# Patient Record
Sex: MALE | Race: WHITE | NOT HISPANIC OR LATINO | Employment: FULL TIME | URBAN - METROPOLITAN AREA
[De-identification: names, ages, dates, MRNs, and addresses within clinical notes are randomized per-mention and may not be internally consistent; named-entity substitution may affect disease eponyms.]

---

## 2018-05-16 ENCOUNTER — TELEPHONE (OUTPATIENT)
Dept: OTHER | Facility: CLINIC | Age: 63
End: 2018-05-16

## 2018-06-14 ENCOUNTER — HOSPITAL ENCOUNTER (OUTPATIENT)
Dept: GENERAL RADIOLOGY | Facility: CLINIC | Age: 63
Discharge: HOME | End: 2018-06-14

## 2018-06-14 DIAGNOSIS — R76.11 POSITIVE PPD: ICD-10-CM

## 2018-06-14 PROCEDURE — 40000293 XR CHEST 1 VIEW, EMPLOYEE HEALTH

## 2019-10-05 ENCOUNTER — HEALTH MAINTENANCE LETTER (OUTPATIENT)
Age: 64
End: 2019-10-05

## 2019-12-05 ENCOUNTER — OFFICE VISIT (OUTPATIENT)
Dept: FAMILY MEDICINE | Facility: CLINIC | Age: 64
End: 2019-12-05
Payer: COMMERCIAL

## 2019-12-05 VITALS
TEMPERATURE: 98.2 F | HEART RATE: 76 BPM | OXYGEN SATURATION: 95 % | DIASTOLIC BLOOD PRESSURE: 82 MMHG | HEIGHT: 74 IN | SYSTOLIC BLOOD PRESSURE: 150 MMHG | WEIGHT: 315 LBS | BODY MASS INDEX: 40.43 KG/M2

## 2019-12-05 DIAGNOSIS — E66.01 MORBID OBESITY (H): ICD-10-CM

## 2019-12-05 DIAGNOSIS — Z12.11 SPECIAL SCREENING FOR MALIGNANT NEOPLASMS, COLON: ICD-10-CM

## 2019-12-05 DIAGNOSIS — Z12.5 SCREENING FOR PROSTATE CANCER: ICD-10-CM

## 2019-12-05 DIAGNOSIS — I10 ESSENTIAL HYPERTENSION: ICD-10-CM

## 2019-12-05 DIAGNOSIS — E66.813 CLASS 3 SEVERE OBESITY WITHOUT SERIOUS COMORBIDITY WITH BODY MASS INDEX (BMI) OF 45.0 TO 49.9 IN ADULT, UNSPECIFIED OBESITY TYPE (H): ICD-10-CM

## 2019-12-05 DIAGNOSIS — Z00.00 WELCOME TO MEDICARE PREVENTIVE VISIT: Primary | ICD-10-CM

## 2019-12-05 DIAGNOSIS — E66.01 CLASS 3 SEVERE OBESITY WITHOUT SERIOUS COMORBIDITY WITH BODY MASS INDEX (BMI) OF 45.0 TO 49.9 IN ADULT, UNSPECIFIED OBESITY TYPE (H): ICD-10-CM

## 2019-12-05 DIAGNOSIS — Z23 NEED FOR SHINGLES VACCINE: ICD-10-CM

## 2019-12-05 DIAGNOSIS — Z83.3 FAMILY HISTORY OF DIABETES MELLITUS: ICD-10-CM

## 2019-12-05 DIAGNOSIS — R73.9 ELEVATED BLOOD SUGAR: ICD-10-CM

## 2019-12-05 PROCEDURE — 90471 IMMUNIZATION ADMIN: CPT | Performed by: FAMILY MEDICINE

## 2019-12-05 PROCEDURE — G0402 INITIAL PREVENTIVE EXAM: HCPCS | Performed by: FAMILY MEDICINE

## 2019-12-05 RX ORDER — LISINOPRIL 20 MG/1
20 TABLET ORAL DAILY
Refills: 0 | COMMUNITY
Start: 2018-12-09 | End: 2019-12-05

## 2019-12-05 RX ORDER — LISINOPRIL 10 MG/1
20 TABLET ORAL DAILY
Qty: 90 TABLET | Refills: 3 | Status: SHIPPED | OUTPATIENT
Start: 2019-12-05 | End: 2019-12-19

## 2019-12-05 ASSESSMENT — ENCOUNTER SYMPTOMS
FEVER: 0
DIZZINESS: 0
HEMATOCHEZIA: 0
ALLERGIC/IMMUNOLOGIC NEGATIVE: 1
HEMATURIA: 0
ENDOCRINE NEGATIVE: 1
ABDOMINAL PAIN: 0
EYE PAIN: 0
NERVOUS/ANXIOUS: 1
COUGH: 0
CHILLS: 0
HEMATOLOGIC/LYMPHATIC NEGATIVE: 1
CONSTIPATION: 1
DIARRHEA: 1
FREQUENCY: 1
MUSCULOSKELETAL NEGATIVE: 1

## 2019-12-05 ASSESSMENT — ACTIVITIES OF DAILY LIVING (ADL)
CURRENT_FUNCTION: HOUSEWORK REQUIRES ASSISTANCE
CURRENT_FUNCTION: LAUNDRY REQUIRES ASSISTANCE
CURRENT_FUNCTION: MONEY MANAGEMENT REQUIRES ASSISTANCE

## 2019-12-05 ASSESSMENT — MIFFLIN-ST. JEOR: SCORE: 2721.45

## 2019-12-05 ASSESSMENT — PATIENT HEALTH QUESTIONNAIRE - PHQ9
SUM OF ALL RESPONSES TO PHQ QUESTIONS 1-9: 21
10. IF YOU CHECKED OFF ANY PROBLEMS, HOW DIFFICULT HAVE THESE PROBLEMS MADE IT FOR YOU TO DO YOUR WORK, TAKE CARE OF THINGS AT HOME, OR GET ALONG WITH OTHER PEOPLE: SOMEWHAT DIFFICULT
SUM OF ALL RESPONSES TO PHQ QUESTIONS 1-9: 21

## 2019-12-05 ASSESSMENT — PAIN SCALES - GENERAL: PAINLEVEL: NO PAIN (0)

## 2019-12-05 NOTE — PROGRESS NOTES
"SUBJECTIVE:   Malcom Coates is a 63 year old male who presents for Preventive Visit.  Annual physical.  History of hypertension, morbid obesity.  Has not seen a physician in a while.  Denies chest pain, denies shortness of breath.  Are you in the first 12 months of your Medicare coverage?  Yes,  Visual Acuity:  Right Eye: wear glasses   Left Eye: wear glasses  Both Eyes:     Healthy Habits:     In general, how would you rate your overall health?  Fair    Frequency of exercise:  None    Do you usually eat at least 4 servings of fruit and vegetables a day, include whole grains    & fiber and avoid regularly eating high fat or \"junk\" foods?  No    Taking medications regularly:  No    Barriers to taking medications:  Cost of medication and Side effects    Medication side effects:  Other    Ability to successfully perform activities of daily living:  Housework requires assistance, laundry requires assistance and money management requires assistance    Home Safety:  Lack of grab bars in the bathroom    Hearing Impairment:  Feel that people are mumbling or not speaking clearly, need to ask people to speak up or repeat themselves and difficulty understanding soft or whispered speech    In the past 6 months, have you been bothered by leaking of urine? Yes    In general, how would you rate your overall mental or emotional health?  Fair      PHQ-2 Total Score: 5    Additional concerns today:  Yes    Do you feel safe in your environment? Yes    Have you ever done Advance Care Planning? (For example, a Health Directive, POLST, or a discussion with a medical provider or your loved ones about your wishes): No, advance care planning information given to patient to review.  Patient plans to discuss their wishes with loved ones or provider.      Fall risk  Fallen 2 or more times in the past year?: No  Any fall with injury in the past year?: No    Cognitive Screening   1) Repeat 3 items (Leader, Season, Table)    2) Clock draw: " NORMAL  3) 3 item recall: Recalls 3 objects  Results: 3 items recalled: COGNITIVE IMPAIRMENT LESS LIKELY    Mini-CogTM Copyright S [employer]rie. Licensed by the author for use in [facility]; reprinted with permission (hamlet@[facility]). All rights reserved.      Do you have sleep apnea, excessive snoring or daytime drowsiness?: restless sleep, excessive snoring    Reviewed and updated as needed this visit by clinical staff  Tobacco  Allergies  Meds  Med Hx  Surg Hx  Fam Hx  Soc Hx        Reviewed and updated as needed this visit by Provider        Social History     Tobacco Use     Smoking status: Never Smoker     Smokeless tobacco: Never Used   Substance Use Topics     Alcohol use: Yes     Comment: occassionally         Alcohol Use 12/5/2019   Prescreen: >3 drinks/day or >7 drinks/week? No   Prescreen: >3 drinks/day or >7 drinks/week? -   No flowsheet data found.        Current providers sharing in care for this patient include:   Patient Care Team:  Enriqueta Hinkle PA-C as PCP - General (Family Practice)    The following health maintenance items are reviewed in Epic and correct as of today:  Health Maintenance   Topic Date Due     HEPATITIS C SCREENING  1955     DEPRESSION ACTION PLAN  1955     HIV SCREENING  12/30/1969     ZOSTER IMMUNIZATION (1 of 2) 12/30/2004     PHQ-9  06/01/2012     MEDICARE ANNUAL WELLNESS VISIT  12/01/2012     FIT  12/28/2012     LIPID  12/01/2016     ADVANCE CARE PLANNING  12/01/2016     DTAP/TDAP/TD IMMUNIZATION (2 - Td) 12/01/2021     INFLUENZA VACCINE  Addressed     IPV IMMUNIZATION  Aged Out     MENINGITIS IMMUNIZATION  Aged Out     BP Readings from Last 3 Encounters:   12/05/19 (!) 150/82   12/01/11 159/92    Wt Readings from Last 3 Encounters:   12/05/19 (!) 185.5 kg (409 lb)   12/01/11 (!) 204.1 kg (450 lb)                      Review of Systems   Constitutional: Negative for chills and fever.   HENT: Negative for congestion and ear pain.    Eyes:  "Negative for pain.   Respiratory: Negative for cough.    Cardiovascular: Negative for chest pain.   Gastrointestinal: Positive for constipation and diarrhea. Negative for abdominal pain and hematochezia.   Endocrine: Negative.    Genitourinary: Positive for frequency. Negative for hematuria.   Musculoskeletal: Negative.    Skin: Negative.    Allergic/Immunologic: Negative.    Neurological: Negative for dizziness.   Hematological: Negative.    Psychiatric/Behavioral: The patient is nervous/anxious.      Constitutional, HEENT, cardiovascular, pulmonary, GI, , musculoskeletal, neuro, skin, endocrine and psych systems are negative, except as otherwise noted.    OBJECTIVE:   BP (!) 174/84 (BP Location: Right arm, Patient Position: Chair, Cuff Size: Adult Large)   Pulse 76   Temp 98.2  F (36.8  C) (Oral)   Ht 1.89 m (6' 2.41\")   Wt (!) 185.5 kg (409 lb)   SpO2 95%   BMI 51.94 kg/m   Estimated body mass index is 51.94 kg/m  as calculated from the following:    Height as of this encounter: 1.89 m (6' 2.41\").    Weight as of this encounter: 185.5 kg (409 lb).  Physical Exam  GENERAL: healthy, alert and no distress  EYES: Eyes grossly normal to inspection, PERRL and conjunctivae and sclerae normal  HENT: ear canals and TM's normal, nose and mouth without ulcers or lesions  NECK: no adenopathy, no asymmetry, masses, or scars and thyroid normal to palpation  RESP: lungs clear to auscultation - no rales, rhonchi or wheezes  CV: regular rate and rhythm, normal S1 S2, no S3 or S4, no murmur, click or rub, no peripheral edema and peripheral pulses strong  ABDOMEN: soft, nontender, no hepatosplenomegaly, no masses and bowel sounds normal  MS: trace edema to bilaterally  SKIN: no suspicious lesions or rashes  NEURO: Normal strength and tone, mentation intact and speech normal  PSYCH: mentation appears normal, affect normal/bright    Diagnostic Test Results:  Labs reviewed in Epic  Orders Placed This Encounter   Procedures     " "DEPRESSION ACTION PLAN (DAP)     VACCINE ADMINISTRATION, INITIAL     Hepatitis C Screen Reflex to HCV RNA Quant and Genotype     HIV Screening     Lipid panel reflex to direct LDL Non-fasting     Fecal colorectal cancer screen (FIT)     PSA, screen     Comprehensive metabolic panel     Hemoglobin A1c     BARIATRIC ADULT REFERRAL       ASSESSMENT / PLAN:       ICD-10-CM    1. Welcome to Medicare preventive visit Z00.00 Hepatitis C Screen Reflex to HCV RNA Quant and Genotype     HIV Screening     Lipid panel reflex to direct LDL Non-fasting     DEPRESSION ACTION PLAN (DAP)     PSA, screen     Comprehensive metabolic panel     zoster vaccine recombinant adjuvanted (SHINGRIX) injection   2. Screening for prostate cancer Z12.5 PSA, screen   3. Morbid obesity (H) E66.01    4. Class 3 severe obesity without serious comorbidity with body mass index (BMI) of 45.0 to 49.9 in adult, unspecified obesity type (H) E66.01 BARIATRIC ADULT REFERRAL    Z68.42    5. Family history of diabetes mellitus Z83.3    6. Essential hypertension I10 lisinopril (PRINIVIL/ZESTRIL) 10 MG tablet   7. Special screening for malignant neoplasms, colon Z12.11 Fecal colorectal cancer screen (FIT)   8. Elevated blood sugar R73.9 Hemoglobin A1c   9. Need for shingles vaccine Z23 VACCINE ADMINISTRATION, INITIAL       COUNSELING:  Reviewed preventive health counseling, as reflected in patient instructions       Regular exercise       Healthy diet/nutrition       Vision screening       Hearing screening       Dental care       Fall risk prevention       Immunizations    Vaccinated for: Zoster             Safe sex practices/STD prevention       Colon cancer screening    Estimated body mass index is 51.94 kg/m  as calculated from the following:    Height as of this encounter: 1.89 m (6' 2.41\").    Weight as of this encounter: 185.5 kg (409 lb).    Weight management plan: Patient referred to endocrine and/or weight management specialty Discussed healthy diet " and exercise guidelines     reports that he has never smoked. He has never used smokeless tobacco.      Appropriate preventive services were discussed with this patient, including applicable screening as appropriate for cardiovascular disease, diabetes, osteopenia/osteoporosis, and glaucoma.  As appropriate for age/gender, discussed screening for colorectal cancer, prostate cancer, breast cancer, and cervical cancer. Checklist reviewing preventive services available has been given to the patient.    Reviewed patients plan of care and provided an AVS. The Basic Care Plan (routine screening as documented in Health Maintenance) for Malcom meets the Care Plan requirement. This Care Plan has been established and reviewed with the Patient.    Counseling Resources:  ATP IV Guidelines  Pooled Cohorts Equation Calculator  Breast Cancer Risk Calculator  FRAX Risk Assessment  ICSI Preventive Guidelines  Dietary Guidelines for Americans, 2010  [employer] MyPlate  ASA Prophylaxis  Lung CA Screening    Henrique Hartman MD  [facility]    Identified Health Risks:  Answers for HPI/ROS submitted by the patient on 12/5/2019   Annual Exam:  If you checked off any problems, how difficult have these problems made it for you to do your work, take care of things at home, or get along with other people?: Somewhat difficult  PHQ9 TOTAL SCORE: 21

## 2019-12-05 NOTE — PATIENT INSTRUCTIONS
Patient Education     Eating Heart-Healthy Foods  Eating has a big impact on your heart health. In fact, eating healthier can improve several of your heart risks at once. For instance, it helps you manage weight, cholesterol, and blood pressure. Here are ideas to help you make heart-healthy changes without giving up all the foods and flavors you love.  Getting started    Talk with your healthcare provider about eating plans, such as the DASH or Mediterranean diet. You may also be referred to a dietitian.    Change a few things at a time. Give yourself time to get used to a few eating changes before adding more.    Work to create a tasty, healthy eating plan that you can stick to for the rest of your life.    Goals for healthy eating  Below are some tips to improve your eating habits:    Limit saturated fats and trans fats. Saturated fats raise your levels of cholesterol, so keep these fats to a minimum. They are found in foods such as fatty meats, whole milk, cheese, and palm and coconut oils. Avoid trans fats because they lower good cholesterol as well as raise bad cholesterol. Trans fats are most often found in processed foods.    Reduce sodium (salt) intake. Eating too much salt may increase your blood pressure. Limit your sodium intake to 2,300 milligrams (mg) per day (the amount in 1 teaspoon of salt), or less if your healthcare provider recommends it. Dining out less often and eating fewer processed foods are two great ways to decrease the amount of salt you consume.    Managing calories. A calorie is a unit of energy. Your body burns calories for fuel, but if you eat more calories than your body burns, the extras are stored as fat. Your healthcare provider can help you create a diet plan to manage your calories. This will likely include eating healthier foods as well as exercising regularly. To help you track your progress, keep a diary to record what you eat and how often you exercise.  Choose the right  foods  Aim to make these foods staples of your diet. If you have diabetes, you may have different recommendations than what is listed here:    Fruits and vegetables provide plenty of nutrients without a lot of calories. At meals, fill half your plate with these foods. Split the other half of your plate between whole grains and lean protein.    Whole grains are high in fiber and rich in vitamins and nutrients. Good choices include whole-wheat bread, pasta, and brown rice.    Lean proteins give you nutrition with less fat. Good choices include fish, skinless chicken, and beans.    Low-fat or nonfat dairy provides nutrients without a lot of fat. Try low-fat or nonfat milk, cheese, or yogurt.    Healthy fats can be good for you in small amounts. These are unsaturated fats, such as olive oil, nuts, and fish. Try to have at least 2 servings per week of fatty fish, such as salmon, sardines, mackerel, rainbow trout, and albacore tuna. These contain omega-3 fatty acids, which are good for your heart. Flaxseed is another source of a heart-healthy fat.  More on heart-healthy eating  Read food labels  Healthy eating starts at the grocery store. Be sure to pay attention to food labels on packaged foods. Look for products that are high in fiber and protein, and low in saturated fat, cholesterol, and sodium. Avoid products that contain trans fat. And pay close attention to serving size. For instance, if you plan to eat two servings, double all the numbers on the label.  Prepare food right  A key part of healthy cooking is cutting down on added fat and salt. Look on the internet for lower-fat, lower-sodium recipes. Also, try these tips:    Remove fat from meat and skin from poultry before cooking.    Skim fat from the surface of soups and sauces.    Broil, boil, bake, steam, grill, and microwave food without added fats.    Choose ingredients that spice up your food without adding calories, fat, or sodium. Try these items:  horseradish, hot sauce, lemon, mustard, nonfat salad dressings, and vinegar. For salt-free herbs and spices, try basil, cilantro, cinnamon, pepper, and rosemary.  Date Last Reviewed: 10/1/2017    9339-4820 [employer]. [address] [place] [address]. All rights reserved. This information is not intended as a substitute for professional medical care. Always follow your healthcare professional's instructions.           Patient Education     Prevention Guidelines, Men Ages 50 to 64  Screening tests and vaccines are an important part of managing your health. A screening test is done to find possible disorders or diseases in people who don't have any symptoms. The goal is to find a disease early so lifestyle changes can be made and you can be watched more closely to reduce the risk of disease, or to detect it early enough to treat it most effectively. Screening tests are not considered diagnostic, but are used to determine if more testing is needed. Health counseling is essential, too. Below are guidelines for these, for men ages 50 to 64. Talk with your healthcare provider to make sure you re up-to-date on what you need.  Screening Who needs it How often   Alcohol misuse All men in this age group At routine exams   Blood pressure All men in this age group Yearly checkup if your blood pressure is normal  Normal blood pressure is less than 120/80 mm Hg  If your blood pressure reading is higher than normal, follow the advice of your healthcare provider      Colorectal cancer All men in this age group Flexible sigmoidoscopy every 5 years, or colonoscopy every 10 years, or double-contrast barium enema every 5 years; yearly fecal occult blood test or fecal immunochemical test; or a stool DNA test as often as your healthcare provider advises; talk with your healthcare provider about which tests are best for you   Depression All men in this age group At routine exams   Type 2 diabetes or prediabetes All  men beginning at age 45 and men without symptoms at any age who are overweight or obese and have 1 or more other risk factors for diabetes At least every 3 years (yearly if your blood sugar has already begun to rise)   Type 2 diabetes All men with prediabetes Every year   Hepatitis C Men at increased risk for infection - talk with your healthcare provider At routine exams. All men ages 50 to 70 should be tested at least once for hepatitis C.   High cholesterol or triglycerides All men in this age group At least every 5 years   HIV Men at increased risk for infection - talk with your healthcare provider At routine exams   Lung cancer Adults age 55 to 80 who have smoked Yearly screening in smokers with 30 pack-year history of smoking or who quit within 15 years   Obesity All men in this age group At routine exams   Prostate cancer Starting at age 45, talk to healthcare provider about risks and benefits of digital rectal exam (WONG) and prostate-specific antigen (PSA) screening1 At routine exams   Syphilis Men at increased risk for infection - talk with your healthcare provider At routine exams   Tuberculosis Men at increased risk for infection - talk with your healthcare provider Ask your healthcare provider   Vision All men in this age group Ask your healthcare provider   Vaccine Who needs it How often   Chickenpox (varicella) All men in this age group who have no record of this infection or vaccine 2 doses; second dose should be given at least 4 weeks after the first dose   Hepatitis A Men at increased risk for infection - talk with your healthcare provider 2 doses given at least 6 months apart   Hepatitis B Men at increased risk for infection - talk with your healthcare provider 3 doses over 6 months; second dose should be given 1 month after the first dose; the third dose should be given at least 2 months after the second dose and at least 4 months after the first dose   Haemophilus influenzae Type B (HIB) Men at  increased risk for infection - talk with your healthcare provider 1 to 3 doses   Influenza (flu) All men in this age group Once a year   Measles, mumps, rubella (MMR) Men in this age group through their late 50s who have no record of these infections or vaccines 1 or 2 doses; ask your healthcare provider   Meningococcal Men at increased risk for infection - talk with your healthcare provider 1 or more doses   Pneumococcal conjugate vaccine (PCV13) and pneumococcal polysaccharide vaccine (PPSV23) Men at increased risk for infection - talk with your healthcare provider PCV13: 1 dose ages 19 to 65 (protects against 13 types of pneumococcal bacteria)     PPSV23: 1 to 2 doses through age 64, or 1 dose at 65 or older (protects against 23 types of pneumococcal bacteria)      Tetanus/diphtheria/  pertussis (Td/Tdap) booster All men in this age group Td every 10 years, or a one-time dose of Tdap instead of a Td booster after age 18, then Td every 10 years   Zoster All men ages 60 and older 1 dose   Counseling Who needs it How often   Diet and exercise Men who are overweight or obese When diagnosed, and then at routine exams   Sexually transmitted infection prevention Men at increased risk for infection - talk with your healthcare provider At routine exams   Use of daily aspirin Men in this age group at risk for cardiovascular health problems At routine exams   Use of tobacco and the health effects it can cause All men in this age group Every visit   [address] [place] [address] [facility]  Date Last Reviewed: 2/1/2017 2000-2018 [employer]. [address] [place] [address], [place] [address]. All rights reserved. This information is not intended as a substitute for professional medical care. Always follow your healthcare professional's instructions.

## 2019-12-05 NOTE — NURSING NOTE
Clinic Administered Medication Documentation    MEDICATION LIST:   Injectable Medication Documentation    Patient was given Shingrix. Prior to medication administration, verified patients identity using patient s name and date of birth. Please see MAR and medication order for additional information. Patient instructed to remain in clinic for 15 minutes, report any adverse reaction to staff immediately  and VIS given.      Was entire vial of medication used? Yes  Vial/Syringe: Single dose vial  Expiration Date:  03-, Diluent: 03-  Was this medication supplied by the patient? No   Colleen Leyva CMA on 12/5/2019 at 11:38 AM

## 2019-12-06 ASSESSMENT — PATIENT HEALTH QUESTIONNAIRE - PHQ9: SUM OF ALL RESPONSES TO PHQ QUESTIONS 1-9: 21

## 2019-12-19 ENCOUNTER — OFFICE VISIT (OUTPATIENT)
Dept: FAMILY MEDICINE | Facility: CLINIC | Age: 64
End: 2019-12-19
Payer: COMMERCIAL

## 2019-12-19 VITALS
TEMPERATURE: 97.5 F | SYSTOLIC BLOOD PRESSURE: 138 MMHG | BODY MASS INDEX: 51.94 KG/M2 | WEIGHT: 315 LBS | OXYGEN SATURATION: 97 % | HEART RATE: 62 BPM | DIASTOLIC BLOOD PRESSURE: 81 MMHG

## 2019-12-19 DIAGNOSIS — Z00.00 ANNUAL PHYSICAL EXAM: Primary | ICD-10-CM

## 2019-12-19 DIAGNOSIS — Z12.5 SCREENING FOR PROSTATE CANCER: ICD-10-CM

## 2019-12-19 DIAGNOSIS — Z83.3 FAMILY HISTORY OF DIABETES MELLITUS: ICD-10-CM

## 2019-12-19 DIAGNOSIS — Z12.11 SPECIAL SCREENING FOR MALIGNANT NEOPLASMS, COLON: ICD-10-CM

## 2019-12-19 DIAGNOSIS — I10 ESSENTIAL HYPERTENSION: ICD-10-CM

## 2019-12-19 LAB
ALBUMIN SERPL-MCNC: 3.9 G/DL (ref 3.4–5)
ALP SERPL-CCNC: 106 U/L (ref 40–150)
ALT SERPL W P-5'-P-CCNC: 24 U/L (ref 0–70)
ANION GAP SERPL CALCULATED.3IONS-SCNC: 4 MMOL/L (ref 3–14)
AST SERPL W P-5'-P-CCNC: 23 U/L (ref 0–45)
BILIRUB SERPL-MCNC: 1.4 MG/DL (ref 0.2–1.3)
BUN SERPL-MCNC: 25 MG/DL (ref 7–30)
CALCIUM SERPL-MCNC: 9.2 MG/DL (ref 8.5–10.1)
CHLORIDE SERPL-SCNC: 106 MMOL/L (ref 94–109)
CHOLEST SERPL-MCNC: 165 MG/DL
CO2 SERPL-SCNC: 30 MMOL/L (ref 20–32)
CREAT SERPL-MCNC: 0.94 MG/DL (ref 0.66–1.25)
GFR SERPL CREATININE-BSD FRML MDRD: 85 ML/MIN/{1.73_M2}
GLUCOSE SERPL-MCNC: 122 MG/DL (ref 70–99)
HBA1C MFR BLD: 6.3 % (ref 0–5.6)
HDLC SERPL-MCNC: 37 MG/DL
HEMOCCULT STL QL IA: NEGATIVE
LDLC SERPL CALC-MCNC: 109 MG/DL
NONHDLC SERPL-MCNC: 128 MG/DL
POTASSIUM SERPL-SCNC: 4.7 MMOL/L (ref 3.4–5.3)
PROT SERPL-MCNC: 7.5 G/DL (ref 6.8–8.8)
PSA SERPL-ACNC: 1.94 UG/L (ref 0–4)
SODIUM SERPL-SCNC: 140 MMOL/L (ref 133–144)
TRIGL SERPL-MCNC: 94 MG/DL

## 2019-12-19 PROCEDURE — 80053 COMPREHEN METABOLIC PANEL: CPT | Performed by: FAMILY MEDICINE

## 2019-12-19 PROCEDURE — G0103 PSA SCREENING: HCPCS | Performed by: FAMILY MEDICINE

## 2019-12-19 PROCEDURE — 83036 HEMOGLOBIN GLYCOSYLATED A1C: CPT | Performed by: FAMILY MEDICINE

## 2019-12-19 PROCEDURE — 86803 HEPATITIS C AB TEST: CPT | Performed by: FAMILY MEDICINE

## 2019-12-19 PROCEDURE — 87389 HIV-1 AG W/HIV-1&-2 AB AG IA: CPT | Performed by: FAMILY MEDICINE

## 2019-12-19 PROCEDURE — 99213 OFFICE O/P EST LOW 20 MIN: CPT | Performed by: FAMILY MEDICINE

## 2019-12-19 PROCEDURE — 36415 COLL VENOUS BLD VENIPUNCTURE: CPT | Performed by: FAMILY MEDICINE

## 2019-12-19 PROCEDURE — 82274 ASSAY TEST FOR BLOOD FECAL: CPT | Performed by: FAMILY MEDICINE

## 2019-12-19 PROCEDURE — 80061 LIPID PANEL: CPT | Performed by: FAMILY MEDICINE

## 2019-12-19 RX ORDER — LISINOPRIL 20 MG/1
20 TABLET ORAL DAILY
Qty: 90 TABLET | Refills: 3 | Status: SHIPPED | OUTPATIENT
Start: 2019-12-19 | End: 2021-02-15

## 2019-12-19 ASSESSMENT — PAIN SCALES - GENERAL: PAINLEVEL: NO PAIN (0)

## 2019-12-19 NOTE — PROGRESS NOTES
Subjective     Malcom Coates is a 63 year old male who presents to clinic today for the following health issues:    HPI :  Patient would like to do his blood work for his annual physical exam.  Blood pressure has been well controlled today he is on lisinopril 20 mg daily.  Has no side effect with the medication.    Medication Followup of Lisiniprol    Taking Medication as prescribed: yes    Side Effects:  None    Medication Helping Symptoms:  yes     Patient is also here for blood recheck.      Patient Active Problem List   Diagnosis     Advanced directives, counseling/discussion     Osteoarthritis (arthritis due to wear and tear of joints)     Moderate major depression (H)     CARDIOVASCULAR SCREENING; LDL GOAL LESS THAN 160     Overactive bladder     Obesity     Elevated blood sugar     Morbid obesity (H)     Leg fracture, left     Past Surgical History:   Procedure Laterality Date     C OPEN TX TIBIAL FRACTURE PROXIMAL UNICONDYLAR  1976    left      HC CLOSED TX SHOULDER DISLOC W MANIP NO ANESTH      LT.       Social History     Tobacco Use     Smoking status: Never Smoker     Smokeless tobacco: Never Used   Substance Use Topics     Alcohol use: Yes     Comment: occassionally     Family History   Problem Relation Age of Onset     Breast Cancer Mother      Cancer Mother         Brain     Diabetes Type 2  Mother      Unknown/Adopted Brother      Allergies Brother      Blood Disease Brother         AIDs     Allergies Brother      Asthma Brother          Current Outpatient Medications   Medication Sig Dispense Refill     lisinopril (PRINIVIL/ZESTRIL) 20 MG tablet Take 1 tablet (20 mg) by mouth daily 90 tablet 3     naproxen (NAPROSYN) 250 MG tablet Take 250 mg by mouth 2 times daily (with meals).       No Known Allergies    Reviewed and updated as needed this visit by Provider         Review of Systems   ROS COMP: Constitutional, HEENT, cardiovascular, pulmonary, gi and gu systems are negative, except as  otherwise noted.      Objective    There were no vitals taken for this visit.  There is no height or weight on file to calculate BMI.  Physical Exam   GENERAL: healthy, alert and no distress  MS: no gross musculoskeletal defects noted, no edema    Diagnostic Test Results:  Labs reviewed in Epic  Orders Placed This Encounter   Procedures     Hepatitis C Screen Reflex to HCV RNA Quant and Genotype     HIV Screening     Lipid panel reflex to direct LDL Fasting     PSA, screen     Comprehensive metabolic panel     Fecal colorectal cancer screen (FIT)     Hemoglobin A1c           Assessment & Plan       ICD-10-CM    1. Annual physical exam Z00.00 Hepatitis C Screen Reflex to HCV RNA Quant and Genotype     HIV Screening     Lipid panel reflex to direct LDL Fasting     Comprehensive metabolic panel   2. Special screening for malignant neoplasms, colon Z12.11 Fecal colorectal cancer screen (FIT)   3. Screening for prostate cancer Z12.5 PSA, screen   4. Family history of diabetes mellitus Z83.3 Hemoglobin A1c   5. Essential hypertension I10 lisinopril (PRINIVIL/ZESTRIL) 20 MG tablet   Continue with current blood pressure medication, lisinopril 20 mg daily.    Discussed diet, weight loss, healthy practices.       See Patient Instructions    Return in about 1 year (around 12/19/2020) for Physical Exam.    Henrique Hartman MD  [facility]

## 2019-12-20 LAB
HCV AB SERPL QL IA: NONREACTIVE
HIV 1+2 AB+HIV1 P24 AG SERPL QL IA: NONREACTIVE

## 2020-02-06 ENCOUNTER — TELEPHONE (OUTPATIENT)
Dept: FAMILY MEDICINE | Facility: CLINIC | Age: 65
End: 2020-02-06

## 2020-02-06 NOTE — LETTER
February 11, 2020      Malcom Coates  [address] [facility] [address]        To Whom It May Concern:    Malcom Coates , has history of obstructive sleep apnea he is on a CPAP machine.    Patient stated that his CPAP machine was broken.  He does need to have new machine.  His last sleep study was done in 2004      Sincerely,        Enriqueta Hinkle PA-C

## 2020-02-06 NOTE — TELEPHONE ENCOUNTER
Reason for Call: Request for an order or referral:    Order or referral being requested: new CPAP machine    Date needed: as soon as possible    Has the patient been seen by the PCP for this problem? NO    Additional comments: patient called [facility] because the machine he got from them in 2004 is broken and won't turn off and on. He was informed that they would need an order from his provider.    Patient would like a call when this is sent to [facility]    Phone number Patient can be reached at:  Cell number on file:    Telephone Information:   Mobile [phone]       Best Time:  anytime    Can we leave a detailed message on this number?  YES    Call taken on 2/6/2020 at 9:55 AM by Justin Rodgers

## 2020-02-07 NOTE — TELEPHONE ENCOUNTER
I do not have a copy of his previous sleep study and I do not have his index.  In order for us to write a new order to obtain a new CPAP machine need to have a flow parameter,  Hypopnea index  CPAP MIN,   And   CPAP MAX.    If had his last sleep study, in 2004 , it maybe better to repeat sleep study and get a new sitting.  If he willing, I could refer him to have a new sleep study  Thanks

## 2020-02-07 NOTE — TELEPHONE ENCOUNTER
Attempt # 1  Called [phone] (home).     Did patient answer the phone: No, left a message on voicemail to return call to triage line at [phone].    Stacey LemonsRN,BSN  [facility]

## 2020-02-10 NOTE — TELEPHONE ENCOUNTER
Attempt # 1  Called [phone] (home)     Did patient answer the phone: No, left a message on voicemail to return call to triage line at [phone].    Stacey LemonsRN,BSN  [facility]

## 2020-02-10 NOTE — TELEPHONE ENCOUNTER
patient returned call - left voice mail to call [phone]            Xuan Alonzo RN  [facility]

## 2020-02-11 NOTE — TELEPHONE ENCOUNTER
Patient called back and left a message on voicemail.  He stated that he broke a switch on his CPAP machine that he uses for apnea.  He needs a new one.    He stated that he contacted [facility] for the replacement and they need a script or letter from provider that stated that he needs a CPCP replacement.  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    Stacey Lemons RN,BSN  [facility]

## 2020-02-13 NOTE — TELEPHONE ENCOUNTER
Pt called back and requested if CPAP orders can be faxed to [phone].Chelsie Moody, Team Coordinatorp

## 2020-02-13 NOTE — TELEPHONE ENCOUNTER
Bee see message below regarding letter.    Leona [facility]  Team 3 Coordinator

## 2020-02-14 ENCOUNTER — TELEPHONE (OUTPATIENT)
Dept: FAMILY MEDICINE | Facility: CLINIC | Age: 65
End: 2020-02-14

## 2020-02-14 DIAGNOSIS — E66.01 MORBID OBESITY (H): Primary | ICD-10-CM

## 2020-02-14 DIAGNOSIS — G47.30 SLEEP APNEA, UNSPECIFIED TYPE: ICD-10-CM

## 2020-02-14 NOTE — TELEPHONE ENCOUNTER
Letter received that they can't supply him a new cpap machine without an office visit.  I have put in the referral back to the sleep clinic so they can get the proper prescription and paperwork done.  He will have to have an office visit to have insurance pay for his cpap machine.    Enriqueta Hinkle PA-C

## 2020-02-14 NOTE — TELEPHONE ENCOUNTER
Message was sent to pt regarding sleep study for CPAP    [facility]  Team 3 Coordinator

## 2020-02-28 ENCOUNTER — ALLIED HEALTH/NURSE VISIT (OUTPATIENT)
Dept: NURSING | Facility: CLINIC | Age: 65
End: 2020-02-28
Payer: COMMERCIAL

## 2020-02-28 DIAGNOSIS — Z23 NEED FOR SHINGLES VACCINE: Primary | ICD-10-CM

## 2020-02-28 PROCEDURE — 90471 IMMUNIZATION ADMIN: CPT

## 2020-02-28 PROCEDURE — 99207 ZZC NO CHARGE NURSE ONLY: CPT

## 2020-02-28 NOTE — NURSING NOTE
Prior to immunization administration, verified patients identity using patient s name and date of birth. Please see Immunization Activity for additional information.     Screening Questionnaire for Adult Immunization    Are you sick today?   No   Do you have allergies to medications, food, a vaccine component or latex?   No   Have you ever had a serious reaction after receiving a vaccination?   No   Do you have a long-term health problem with heart, lung, kidney, or metabolic disease (e.g., diabetes), asthma, a blood disorder, no spleen, complement component deficiency, a cochlear implant, or a spinal fluid leak?  Are you on long-term aspirin therapy?   No   Do you have cancer, leukemia, HIV/AIDS, or any other immune system problem?   No   Do you have a parent, brother, or sister with an immune system problem?   No   In the past 3 months, have you taken medications that affect  your immune system, such as prednisone, other steroids, or anticancer drugs; drugs for the treatment of rheumatoid arthritis, Crohn s disease, or psoriasis; or have you had radiation treatments?   No   Have you had a seizure, or a brain or other nervous system problem?   No   During the past year, have you received a transfusion of blood or blood    products, or been given immune (gamma) globulin or antiviral drug?   No   For women: Are you pregnant or is there a chance you could become       pregnant during the next month?   No   Have you received any vaccinations in the past 4 weeks?   No     Immunization questionnaire answers were all negative.        Per orders of Dr. Hinkle, injection of Second Shingrix vaccines given by Elvin Naranjo MA. Patient instructed to remain in clinic for 15 minutes afterwards, and to report any adverse reaction to me immediately.       Screening performed by Elvin Naranjo MA on 2/28/2020 at 9:52 AM.  VIS for Second Shingrix vaccines given on same date of administration.  Staff signature/Title: Elvin VARGAS  ABHISHEK Naranjo on 2/28/2020 at 9:53 AM

## 2020-04-02 ENCOUNTER — TELEPHONE (OUTPATIENT)
Dept: SLEEP MEDICINE | Facility: CLINIC | Age: 65
End: 2020-04-02

## 2020-04-23 ENCOUNTER — TELEPHONE (OUTPATIENT)
Dept: SLEEP MEDICINE | Facility: CLINIC | Age: 65
End: 2020-04-23

## 2020-04-23 NOTE — TELEPHONE ENCOUNTER
Spoke with Rosio and she helped scheduled the patient an appointment per providers request.    Silvia Collins [facility]

## 2020-11-14 ENCOUNTER — HEALTH MAINTENANCE LETTER (OUTPATIENT)
Age: 65
End: 2020-11-14

## 2021-01-15 ENCOUNTER — HEALTH MAINTENANCE LETTER (OUTPATIENT)
Age: 66
End: 2021-01-15

## 2021-05-03 DIAGNOSIS — I10 ESSENTIAL HYPERTENSION: ICD-10-CM

## 2021-05-03 RX ORDER — LISINOPRIL 20 MG/1
20 TABLET ORAL DAILY
Qty: 30 TABLET | Refills: 0 | OUTPATIENT
Start: 2021-05-03

## 2021-05-03 NOTE — LETTER
May 6, 2021      Malcom Coates  [address] [address]        Dear Malcom,     We recently received a call from your pharmacy requesting a refill of your medication.     A review of your chart indicates that an appointment is required with your provider. Please call the clinic at [phone] to schedule your appointment.      If you have a history of diabetes or high cholesterol, please come fasting to the appointment. Fasting entails nothing to eat or drink 8 hours prior to your appointment; with the exception of water. You may take your medication the day of the appointment.        Sincerely,        Henrique Hartman MD

## 2021-05-03 NOTE — LETTER
May 11, 2021      Malcom Coates  [address] [address]        Dear Malcom,     May 11, 2021    Malcom Coates  [address] [address]    Dear of Malcom,    We care about your health and have reviewed your health plan. We have reviewed your medical conditions, medication list, and lab results and are making recommendations based on this review, to better manage your health.    You are in particular need of attention regarding:  - Depression  - Scheduling a Colon Cancer Screening (Colonoscopy only) [phone] for [facility], call clinic for referral elsewhere  - Scheduling an Annual Physical / Wellness Visit with your primary care provider  - Medications Refills     Here is a list of Health Maintenance topics that are due now or due soon:  Health Maintenance Due   Topic Date Due     ANNUAL REVIEW OF HM ORDERS  Never done     DEPRESSION ACTION PLAN  Never done     COVID-19 Vaccine (1) Never done     Pneumococcal Vaccine: 65+ Years (1 of 1 - PPSV23) Never done     AORTIC ANEURYSM SCREENING (SYSTEM ASSIGNED)  Never done     PHQ-9  06/05/2020     MEDICARE ANNUAL WELLNESS VISIT  12/05/2020     FALL RISK ASSESSMENT  12/05/2020     COLORECTAL CANCER SCREENING  12/19/2020     Please call us at [phone] (or use [employer]) to address the above recommendations.     Thank you for trusting [facility] with your healthcare needs. We appreciate the opportunity to serve you and look forward to supporting you in the future.    Healthy Regards,    Henrique Hartman MD

## 2021-05-03 NOTE — TELEPHONE ENCOUNTER
BP Readings from Last 3 Encounters:   12/19/19 138/81   12/05/19 (!) 150/82   12/01/11 159/92     Creatinine   Date Value Ref Range Status   12/19/2019 0.94 0.66 - 1.25 mg/dL Final     Potassium   Date Value Ref Range Status   12/19/2019 4.7 3.4 - 5.3 mmol/L Final     Last Written Prescription Date:  4/6/21  Last Fill Quantity: 30,  # refills: 0   Last office visit: 12/19/19 with prescribing provider:  Dr. Hartman with yearly F/U advised.  Future Office Visit: none    Routing refill request to provider for review/approval because:  Labs not current:  BP, Creatinine, Potassium  Patient needs to be seen because:  Overdue for physical    Med pended with reminder due for appt. Please advise.    Monica Astorga, RN, BSN  [facility]

## 2021-05-04 NOTE — TELEPHONE ENCOUNTER
[employer] message sent to patient to schedule a physical exam.  Denise Neville CMA ([facility])

## 2021-05-06 NOTE — TELEPHONE ENCOUNTER
Letter mailed to patient's home address to call back and schedule an appointment.  Denise Neville CMA ([facility])

## 2021-05-11 NOTE — TELEPHONE ENCOUNTER
Health Maintenance latter mailed to patient's home address.  Denise Neville CMA ([facility])

## 2021-09-12 ENCOUNTER — HEALTH MAINTENANCE LETTER (OUTPATIENT)
Age: 66
End: 2021-09-12

## 2021-09-29 ENCOUNTER — TELEPHONE (OUTPATIENT)
Dept: FAMILY MEDICINE | Facility: CLINIC | Age: 66
End: 2021-09-29

## 2021-09-29 NOTE — TELEPHONE ENCOUNTER
Called pt and lvm to call back and let us know where he was seen in the ICU. We were not able to pull any records. If pt calls back please verify information and sent to Eugenie SCHMIDT MA

## 2021-09-30 ENCOUNTER — TELEPHONE (OUTPATIENT)
Dept: FAMILY MEDICINE | Facility: CLINIC | Age: 66
End: 2021-09-30

## 2021-09-30 NOTE — TELEPHONE ENCOUNTER
Incoming call to RN line.  patient calling back, he missed his appt and needs to reschedule.  Transferred patient to  to assist him in getting an appt.  Gaby Sheridan RN  [facility]

## 2021-10-06 ENCOUNTER — MYC MEDICAL ADVICE (OUTPATIENT)
Dept: FAMILY MEDICINE | Facility: CLINIC | Age: 66
End: 2021-10-06

## 2021-10-06 ENCOUNTER — VIRTUAL VISIT (OUTPATIENT)
Dept: FAMILY MEDICINE | Facility: CLINIC | Age: 66
End: 2021-10-06
Payer: COMMERCIAL

## 2021-10-06 DIAGNOSIS — T50.Z95A ADVERSE EFFECT OF VACCINE, INITIAL ENCOUNTER: Primary | ICD-10-CM

## 2021-10-06 DIAGNOSIS — Z86.79 HX OF CARDIOMYOPATHY: ICD-10-CM

## 2021-10-06 PROCEDURE — 99213 OFFICE O/P EST LOW 20 MIN: CPT | Mod: TEL | Performed by: NURSE PRACTITIONER

## 2021-10-06 ASSESSMENT — PATIENT HEALTH QUESTIONNAIRE - PHQ9: SUM OF ALL RESPONSES TO PHQ QUESTIONS 1-9: 11

## 2021-10-06 NOTE — PROGRESS NOTES
Bong is a 65 year old who is being evaluated via a billable telephone visit.      What phone number would you like to be contacted at? [phone]  How would you like to obtain your AVS? MyChart    Assessment & Plan       ICD-10-CM    1. Adverse effect of vaccine, initial encounter  T50.Z95A Adult Cardiology Eval Referral     CANCELED: Adult Cardiology Eval Referral   2. Hx of cardiomyopathy  Z86.79 Adult Cardiology Eval Referral     This appointment was scheduled to discuss providing pt with a medical letter waiving him from getting covid 19 vaccine as it is mandated by his employer and he will be terminated or have to do weekly covid 19 tests. He did test + to covid 19 back in January 2021.   He reports a severe adverse reaction to a flu vaccine administered sometime in the 1990's where he was hospitalized at [facility] in the ICU for viral cardiomyopathy. There are no records available to review. Advised pt that it is unlikely that he would develop myocarditis and pericarditis, and this has been seen mainly in male adolescents and young adults following receipt of the mRNA vaccine. Advised pt that I cannot write a letter of exemption. He can speak with cardiology. Referral placed.             No follow-ups on file.    KAIDEN Garcia [facility]   Bong is a 65 year old who presents for the following health issues     HPI     discuss form for employer for waver for COVID and flu vaccines. Patient state that several years ago he had a severe reaction to flu vaccine that ended up in CHF and in ICU  Patient had positive COVID 19 test in December, 2020. and was quarantined for two weeks into January 2021 before I was able to return to work as a  for [employer].    2001 - got a flu vaccine within 10 days he had a cough and SOB. He just about collapsed. He had lower extremity swelling he went to [facility]. He was diagnosed with viral  cardiomyopathy and hypertension.   He was suppose to be followed by cardiology for echocardiograms but he had a lapse in insurance and was unable to return.  Today he feels fine he has no heart problems or symptoms of cough, shortness of breath, orthopnea or lower extremity edema.  He was recently seen by an outside provider for a physical he also requested that this provider provide him a letter for work to waiver him from getting the Covid vaccine since he had a severe reaction to the flu vaccine.        Review of Systems   Constitutional, HEENT, cardiovascular, pulmonary, gi and gu systems are negative, except as otherwise noted.      Objective           Vitals:  No vitals were obtained today due to virtual visit.    Physical Exam   healthy, alert and no distress  PSYCH: Alert and oriented times 3; coherent speech, normal   rate and volume, able to articulate logical thoughts, able   to abstract reason, no tangential thoughts, no hallucinations   or delusions  His affect is normal  RESP: No cough, no audible wheezing, able to talk in full sentences  Remainder of exam unable to be completed due to telephone visits    Office Visit on 12/19/2019   Component Date Value Ref Range Status     Hepatitis C Antibody 12/19/2019 Nonreactive  NR^Nonreactive Final    Comment: Assay performance characteristics have not been established for newborns,   infants, and children       HIV Antigen Antibody Combo 12/19/2019 Nonreactive  NR^Nonreactive     Final    HIV-1 p24 Ag & HIV-1/HIV-2 Ab Not Detected     Cholesterol 12/19/2019 165  <200 mg/dL Final     Triglycerides 12/19/2019 94  <150 mg/dL Final    Fasting specimen     HDL Cholesterol 12/19/2019 37* >39 mg/dL Final     LDL Cholesterol Calculated 12/19/2019 109* <100 mg/dL Final    Comment: Above desirable:  100-129 mg/dl  Borderline High:  130-159 mg/dL  High:             160-189 mg/dL  Very high:       >189 mg/dl       Non HDL Cholesterol 12/19/2019 128  <130 mg/dL Final      PSA 12/19/2019 1.94  0 - 4 ug/L Final    Assay Method:  Chemiluminescence using Siemens Vista analyzer     Sodium 12/19/2019 140  133 - 144 mmol/L Final     Potassium 12/19/2019 4.7  3.4 - 5.3 mmol/L Final     Chloride 12/19/2019 106  94 - 109 mmol/L Final     Carbon Dioxide 12/19/2019 30  20 - 32 mmol/L Final     Anion Gap 12/19/2019 4  3 - 14 mmol/L Final     Glucose 12/19/2019 122* 70 - 99 mg/dL Final    Fasting specimen     Urea Nitrogen 12/19/2019 25  7 - 30 mg/dL Final     Creatinine 12/19/2019 0.94  0.66 - 1.25 mg/dL Final     GFR Estimate 12/19/2019 85  >60 mL/min/[1.73_m2] Final    Comment: Non  GFR Calc  Starting 12/18/2018, serum creatinine based estimated GFR (eGFR) will be   calculated using the Chronic Kidney Disease Epidemiology Collaboration   (CKD-EPI) equation.       GFR Estimate If Black 12/19/2019 >90  >60 mL/min/[1.73_m2] Final    Comment:  GFR Calc  Starting 12/18/2018, serum creatinine based estimated GFR (eGFR) will be   calculated using the Chronic Kidney Disease Epidemiology Collaboration   (CKD-EPI) equation.       Calcium 12/19/2019 9.2  8.5 - 10.1 mg/dL Final     Bilirubin Total 12/19/2019 1.4* 0.2 - 1.3 mg/dL Final     Albumin 12/19/2019 3.9  3.4 - 5.0 g/dL Final     Protein Total 12/19/2019 7.5  6.8 - 8.8 g/dL Final     Alkaline Phosphatase 12/19/2019 106  40 - 150 U/L Final     ALT 12/19/2019 24  0 - 70 U/L Final     AST 12/19/2019 23  0 - 45 U/L Final     Hemoglobin A1C 12/19/2019 6.3* 0 - 5.6 % Final    Comment: Normal <5.7% Prediabetes 5.7-6.4%  Diabetes 6.5% or higher - adopted from ADA   consensus guidelines.       Occult Blood Scn FIT 12/19/2019 Negative  NEG^Negative Final               Phone call duration: 30minutes

## 2021-10-06 NOTE — TELEPHONE ENCOUNTER
Routing to Tiffani Santos NP - [facility] regarding upcoming visit with pt this afternoon.    Jenise Flower RN

## 2022-02-24 ENCOUNTER — TELEPHONE (OUTPATIENT)
Dept: FAMILY MEDICINE | Facility: CLINIC | Age: 67
End: 2022-02-24
Payer: COMMERCIAL

## 2022-02-24 NOTE — LETTER
March 29, 2022      Malcom Coates  [address] [address]        Dear Malcom,     We care about your health and have reviewed your health plan. We have reviewed your medical conditions, medication list, and lab results and are making recommendations based on this review, to better manage your health.    You are in particular need of attention regarding:  - Scheduling a Colon Cancer Screening (FIT) Call clinic to  FIT test and mail completed test to clinic  - Scheduling an Annual Physical / Wellness Visit with your primary care provider  - Influenza vaccine. If you had this done elsewhere, please let us know the date it was completed.  - Please complete PHQ9 and return with enclosed envelope.                                               Here is a list of Health Maintenance topics that are due now or due soon:  Health Maintenance Due   Topic Date Due     ANNUAL REVIEW OF HM ORDERS  Never done     DEPRESSION ACTION PLAN  Never done     COVID-19 Vaccine (1) Never done     Pneumococcal Vaccine: 65+ Years (1 of 1 - PPSV23) Never done     AORTIC ANEURYSM SCREENING (SYSTEM ASSIGNED)  Never done     MEDICARE ANNUAL WELLNESS VISIT  12/05/2020     FALL RISK ASSESSMENT  12/05/2020     COLORECTAL CANCER SCREENING  12/19/2020     INFLUENZA VACCINE (1) 09/01/2021     DTAP/TDAP/TD IMMUNIZATION (2 - Td or Tdap) 12/01/2021     PHQ-9  04/06/2022       Please call us at [phone] (or use [employer]) to address the above recommendations.     Thank you for trusting [facility] with your healthcare needs. We appreciate the opportunity to serve you and look forward to supporting you in the future.    Healthy Regards,    Your Care Team

## 2022-02-24 NOTE — TELEPHONE ENCOUNTER
Patient Quality Outreach    Patient is due for the following:   Depression  -  PHQ-9 Needed, Depression follow-up visit and DAP  Physical  - Due after 12/05/2020  Immunizations  -  Tdap, covid    NEXT STEPS:   Schedule a yearly physical    Type of outreach:    Sent [employer] message.      Questions for provider review:    None     Benitez Landa MA  Chart routed to Care Team.

## 2022-02-27 ENCOUNTER — HEALTH MAINTENANCE LETTER (OUTPATIENT)
Age: 67
End: 2022-02-27

## 2022-03-29 NOTE — TELEPHONE ENCOUNTER
Patient Quality Outreach    Patient is due for the following:   Colon Cancer Screening -  FIT  Depression  -  PHQ-9 Needed and DAP  Physical  - Due after 12/05/2020  Immunizations  -  Covid, Influenza, Pneumococcal and Tdap    NEXT STEPS:   Schedule a yearly physical    Type of outreach:    Sent letter.    Next Steps:  Reach out within 90 days via Letter.    Max number of attempts reached: No. Will try again in 90 days if patient still on fail list.    Questions for provider review:    None     Benitez Landa MA

## 2022-11-19 ENCOUNTER — HEALTH MAINTENANCE LETTER (OUTPATIENT)
Age: 67
End: 2022-11-19

## 2023-04-04 ENCOUNTER — OFFICE VISIT (OUTPATIENT)
Dept: FAMILY MEDICINE | Facility: CLINIC | Age: 68
End: 2023-04-04
Payer: COMMERCIAL

## 2023-04-04 ENCOUNTER — LAB (OUTPATIENT)
Dept: FAMILY MEDICINE | Facility: CLINIC | Age: 68
End: 2023-04-04

## 2023-04-04 ENCOUNTER — TELEPHONE (OUTPATIENT)
Dept: FAMILY MEDICINE | Facility: CLINIC | Age: 68
End: 2023-04-04

## 2023-04-04 VITALS
TEMPERATURE: 97.6 F | RESPIRATION RATE: 26 BRPM | OXYGEN SATURATION: 98 % | HEIGHT: 74 IN | SYSTOLIC BLOOD PRESSURE: 150 MMHG | DIASTOLIC BLOOD PRESSURE: 90 MMHG | BODY MASS INDEX: 40.43 KG/M2 | WEIGHT: 315 LBS | HEART RATE: 95 BPM

## 2023-04-04 DIAGNOSIS — Z12.11 SCREEN FOR COLON CANCER: ICD-10-CM

## 2023-04-04 DIAGNOSIS — I10 ESSENTIAL HYPERTENSION: ICD-10-CM

## 2023-04-04 DIAGNOSIS — E66.01 MORBID OBESITY (H): ICD-10-CM

## 2023-04-04 DIAGNOSIS — E66.01 MORBID OBESITY (H): Primary | ICD-10-CM

## 2023-04-04 DIAGNOSIS — E11.00 TYPE 2 DIABETES MELLITUS WITH HYPEROSMOLARITY WITHOUT COMA, WITHOUT LONG-TERM CURRENT USE OF INSULIN (H): ICD-10-CM

## 2023-04-04 DIAGNOSIS — I42.0 DILATED CARDIOMYOPATHY (H): ICD-10-CM

## 2023-04-04 DIAGNOSIS — Z00.00 ENCOUNTER FOR MEDICARE ANNUAL WELLNESS EXAM: Primary | ICD-10-CM

## 2023-04-04 DIAGNOSIS — R73.9 ELEVATED BLOOD SUGAR: ICD-10-CM

## 2023-04-04 DIAGNOSIS — Z12.5 SCREENING FOR PROSTATE CANCER: ICD-10-CM

## 2023-04-04 DIAGNOSIS — N39.41 URGE INCONTINENCE OF URINE: ICD-10-CM

## 2023-04-04 LAB
ALBUMIN SERPL BCG-MCNC: 4.2 G/DL (ref 3.5–5.2)
ALP SERPL-CCNC: 112 U/L (ref 40–129)
ALT SERPL W P-5'-P-CCNC: 14 U/L (ref 10–50)
ANION GAP SERPL CALCULATED.3IONS-SCNC: 13 MMOL/L (ref 7–15)
AST SERPL W P-5'-P-CCNC: 19 U/L (ref 10–50)
BASOPHILS # BLD AUTO: 0.1 10E3/UL (ref 0–0.2)
BASOPHILS NFR BLD AUTO: 1 %
BILIRUB SERPL-MCNC: 1.2 MG/DL
BUN SERPL-MCNC: 20.5 MG/DL (ref 8–23)
CALCIUM SERPL-MCNC: 10.3 MG/DL (ref 8.8–10.2)
CHLORIDE SERPL-SCNC: 100 MMOL/L (ref 98–107)
CHOLEST SERPL-MCNC: 188 MG/DL
CREAT SERPL-MCNC: 1.13 MG/DL (ref 0.67–1.17)
DEPRECATED HCO3 PLAS-SCNC: 27 MMOL/L (ref 22–29)
EOSINOPHIL # BLD AUTO: 0.4 10E3/UL (ref 0–0.7)
EOSINOPHIL NFR BLD AUTO: 6 %
ERYTHROCYTE [DISTWIDTH] IN BLOOD BY AUTOMATED COUNT: 14.1 % (ref 10–15)
GFR SERPL CREATININE-BSD FRML MDRD: 71 ML/MIN/1.73M2
GLUCOSE SERPL-MCNC: 148 MG/DL (ref 70–99)
HBA1C MFR BLD: 7.4 % (ref 0–5.6)
HCT VFR BLD AUTO: 49.1 % (ref 40–53)
HDLC SERPL-MCNC: 33 MG/DL
HGB BLD-MCNC: 15.9 G/DL (ref 13.3–17.7)
LDLC SERPL CALC-MCNC: 131 MG/DL
LYMPHOCYTES # BLD AUTO: 2.1 10E3/UL (ref 0.8–5.3)
LYMPHOCYTES NFR BLD AUTO: 31 %
MCH RBC QN AUTO: 29.4 PG (ref 26.5–33)
MCHC RBC AUTO-ENTMCNC: 32.4 G/DL (ref 31.5–36.5)
MCV RBC AUTO: 91 FL (ref 78–100)
MONOCYTES # BLD AUTO: 0.6 10E3/UL (ref 0–1.3)
MONOCYTES NFR BLD AUTO: 9 %
NEUTROPHILS # BLD AUTO: 3.6 10E3/UL (ref 1.6–8.3)
NEUTROPHILS NFR BLD AUTO: 53 %
NONHDLC SERPL-MCNC: 155 MG/DL
PLATELET # BLD AUTO: 208 10E3/UL (ref 150–450)
POTASSIUM SERPL-SCNC: 5 MMOL/L (ref 3.4–5.3)
PROT SERPL-MCNC: 7.6 G/DL (ref 6.4–8.3)
PSA SERPL DL<=0.01 NG/ML-MCNC: 2.92 NG/ML (ref 0–4.5)
RBC # BLD AUTO: 5.41 10E6/UL (ref 4.4–5.9)
SODIUM SERPL-SCNC: 140 MMOL/L (ref 136–145)
TRIGL SERPL-MCNC: 119 MG/DL
WBC # BLD AUTO: 6.8 10E3/UL (ref 4–11)

## 2023-04-04 PROCEDURE — 80061 LIPID PANEL: CPT | Performed by: FAMILY MEDICINE

## 2023-04-04 PROCEDURE — 99214 OFFICE O/P EST MOD 30 MIN: CPT | Mod: 25 | Performed by: FAMILY MEDICINE

## 2023-04-04 PROCEDURE — G0438 PPPS, INITIAL VISIT: HCPCS | Performed by: FAMILY MEDICINE

## 2023-04-04 PROCEDURE — 85025 COMPLETE CBC W/AUTO DIFF WBC: CPT | Performed by: FAMILY MEDICINE

## 2023-04-04 PROCEDURE — 83036 HEMOGLOBIN GLYCOSYLATED A1C: CPT | Performed by: FAMILY MEDICINE

## 2023-04-04 PROCEDURE — G0103 PSA SCREENING: HCPCS | Performed by: FAMILY MEDICINE

## 2023-04-04 PROCEDURE — 80053 COMPREHEN METABOLIC PANEL: CPT | Performed by: FAMILY MEDICINE

## 2023-04-04 PROCEDURE — 36415 COLL VENOUS BLD VENIPUNCTURE: CPT | Performed by: FAMILY MEDICINE

## 2023-04-04 PROCEDURE — 90677 PCV20 VACCINE IM: CPT | Performed by: FAMILY MEDICINE

## 2023-04-04 PROCEDURE — G0009 ADMIN PNEUMOCOCCAL VACCINE: HCPCS | Performed by: FAMILY MEDICINE

## 2023-04-04 RX ORDER — LISINOPRIL 20 MG/1
20 TABLET ORAL DAILY
Qty: 90 TABLET | Refills: 3 | Status: SHIPPED | OUTPATIENT
Start: 2023-04-04 | End: 2024-04-10

## 2023-04-04 ASSESSMENT — ENCOUNTER SYMPTOMS
DIARRHEA: 0
EYE PAIN: 0
NAUSEA: 0
NERVOUS/ANXIOUS: 0
MYALGIAS: 0
JOINT SWELLING: 0
FEVER: 0
SHORTNESS OF BREATH: 0
HEARTBURN: 0
DIZZINESS: 0
PALPITATIONS: 0
ABDOMINAL PAIN: 0
HEMATOCHEZIA: 0
HEMATOLOGIC/LYMPHATIC NEGATIVE: 1
PARESTHESIAS: 0
HEADACHES: 0
ARTHRALGIAS: 1
SORE THROAT: 0
CHILLS: 0
CONSTIPATION: 0
ALLERGIC/IMMUNOLOGIC NEGATIVE: 1
HEMATURIA: 0
DYSURIA: 0
ENDOCRINE NEGATIVE: 1
FREQUENCY: 1
COUGH: 0
WEAKNESS: 1

## 2023-04-04 ASSESSMENT — PAIN SCALES - GENERAL: PAINLEVEL: NO PAIN (0)

## 2023-04-04 ASSESSMENT — PATIENT HEALTH QUESTIONNAIRE - PHQ9
SUM OF ALL RESPONSES TO PHQ QUESTIONS 1-9: 18
10. IF YOU CHECKED OFF ANY PROBLEMS, HOW DIFFICULT HAVE THESE PROBLEMS MADE IT FOR YOU TO DO YOUR WORK, TAKE CARE OF THINGS AT HOME, OR GET ALONG WITH OTHER PEOPLE: SOMEWHAT DIFFICULT
SUM OF ALL RESPONSES TO PHQ QUESTIONS 1-9: 18

## 2023-04-04 ASSESSMENT — ACTIVITIES OF DAILY LIVING (ADL): CURRENT_FUNCTION: NO ASSISTANCE NEEDED

## 2023-04-04 NOTE — TELEPHONE ENCOUNTER
Called patient      He was hesitant to take medications at first and wanted to work more on diet.  Encouraged him that diet along with medication would be the most beneficial.    He would like a referral placed to a nutritionist to help him with making adjustment to his diet. Referral pended      Joi Rodriguez RN

## 2023-04-04 NOTE — PATIENT INSTRUCTIONS
"  Patient Education   Personalized Prevention Plan  You are due for the preventive services outlined below.  Your care team is available to assist you in scheduling these services.  If you have already completed any of these items, please share that information with your care team to update in your medical record.  Health Maintenance Due   Topic Date Due    ANNUAL REVIEW OF HM ORDERS  Never done    Depression Action Plan  Never done    COVID-19 Vaccine (1) Never done    Pneumococcal Vaccine (1 - PCV) Never done    AORTIC ANEURYSM SCREENING (SYSTEM ASSIGNED)  Never done    Annual Wellness Visit  12/05/2020    Colorectal Cancer Screening  12/19/2020    Diptheria Tetanus Pertussis (DTAP/TDAP/TD) Vaccine (3 - Td or Tdap) 12/01/2021    Flu Vaccine (1) 09/01/2022       Depression and Suicide in Older Adults  Nearly 2 million older adults in the [place] have some type of depression. Some of them even take their own lives. Yet depression among older adults is often ignored. Learning about the warning signs of depression may help spare a loved one needless pain. You may also save a life.   What is depression?  Depression is a common and serious illness. It affects the way you think and feel. It is not a normal part of aging. It is not a sign of weakness, a character flaw, or something you can \"snap out of.\" Most people with depression need treatment to get better. The most common symptom is a feeling of deep sadness. People who are depressed also may seem tired and listless. And nothing seems to give them pleasure. It s normal to grieve or be sad sometimes. But sadness lessens or passes with time. Depression rarely goes away or improves on its own. Other symptoms of depression are:   Sleeping more or less than normal  Eating more or less than normal  Having headaches, stomachaches, or other pains that don t go away  Feeling nervous,  empty,  or worthless  Crying a lot  Thinking or talking about suicide or death  Loss of " interest in activities previously enjoyed  Social isolation  Feeling confused or forgetful  What causes it?  The causes of depression aren t fully known. But it is thought to result from a complex blend of these factors:   Biochemistry. Certain chemicals in the brain play a role.  Genes. Depression does run in families.  Life stress. Life stresses can also trigger depression in some people. Older adults often face many stressors. These may include isolation, the death of friends or a spouse, health problems, and financial concerns.  Chronic health conditions. This includes diabetes, heart disease, or cancer. These can cause symptoms of depression. Medicine side effects can cause changes in thoughts and behaviors.  Giving support    Depressed people often may not want to ask for help. When they do, they may be ignored. Or they may get the wrong treatment. You can help by showing parents and older friends love and support. If they seem depressed, don t lecture the person or ignore the symptoms. Don't discount the symptoms as a  normal  part of aging. They are not. Get involved, listen, and show interest and support.   Help them understand that depression is a treatable illness. Tell them you can help them find the right treatment. Offer to go to their healthcare provider's appointment with them for support when the symptoms are discussed. With their approval, contact a local mental health center, social service agency, or hospital about services.   Helping at healthcare visits  You can be an advocate for them at healthcare appointments. Many older adults have chronic illnesses. Many of these can cause symptoms of depression. Medicine side effects can change thoughts and behaviors.   You can help make sure that the healthcare provider looks at all of these factors. They should refer your family member or friend to a mental healthcare provider when needed. In some cases, untreated depression can lead to a misdiagnosis.  A person may be diagnosed with a brain disorder such as dementia. If the healthcare provider does not take the issue of depression seriously, help your family member or friend find another provider.   Asking about self-harm thoughts  If you think an older person you care about could be suicidal, ask,  Have you thought about suicide?  Most people will tell you the truth. If they say yes, they may already have a plan for how and when they will attempt it. Find out as much as you can. The more detailed the plan, and the easier it is to carry out, the more danger the person is in right now. Tell the person you are there for them and you do not want them to get harmed. Don't wait to get help for the person. Call the person's healthcare provider, local hospital, or emergency services.   Call 988 in a crisis   Never leave the person alone. A person who is actively suicidal needs crisis care right away. They need constant supervision. Never leave the person out of sight. Call 988 Tell the crisis counselor you need help for a person who is thinking about suicide. The counselor will help you get the right level of crisis help. You may be advised to take the person to the nearest emergency room.   The National Suicide Prevention Lifeline is available at 988, or 374-074-GFET (793-275-3425), or www.suicidepreventionlifeline.org. When you call or text 988, you will be connected to trained counselors who are part of the National Suicide Prevention Lifeline network. An online chat option is also available. Lifeline is free and available 24/7.   To learn more  National Suicide Prevention Lifeline at www.suicidepreventionlifeline.org  or 962-337-EEER (733-278-4584)  National [place] on Mental Illness at www.armani.org  or 940-797-LQGI (761-415-4732)  [facility] [place] at www.nmha.org  or 136-635-0737  National [place] of Mental Health at www.nimh.nih.gov  or 889-231-5411    [employer]yne last reviewed this educational content on  7/1/2022 2000-2022 [employer]. All rights reserved. This information is not intended as a substitute for professional medical care. Always follow your healthcare professional's instructions.    Coping with Depression  Crisis Text Line  http://www.crisistextline.org     The Crisis Text Line serves anyone, in any type of crisis, providing access to free, 24/7 support and information via the medium people already use and trust:     Here's how it works:  Text 031-580 from anywhere in the [place], anytime, about any type of crisis.  A live, trained Crisis Counselor receives the text and responds quickly.  The volunteer Crisis Counselor will help you move from a 'hot moment to a cool moment'

## 2023-04-04 NOTE — PROGRESS NOTES
"  The patient s PHQ-9 score is consistent with moderate depression. He was provided with information regarding depression and was advised to schedule a follow up appointment in 4- 6  weeks to further address this issue.  SUBJECTIVE:   Bong is a 67 year old who presents for Preventive Visit.    Comes for an annual exam, history of hypertension he has been out of his medication for a few weeks he denies chest pain, short of breath, denies headache.  Underlying depression.  However, he denies suicidal thoughts or ideation no alcohol abuse no drugs abuse.  He reports he is feeling well otherwise.    Patient does have history of urinary incontinence, he has no pain, no discharges.        4/4/2023     8:30 AM   Additional Questions   Roomed by Colleen Leyva   Accompanied by None         4/4/2023     8:30 AM   Patient Reported Additional Medications   Patient reports taking the following new medications No   Patient has been advised of split billing requirements and indicates understanding: Yes  Are you in the first 12 months of your Medicare coverage?  No    Healthy Habits:     In general, how would you rate your overall health?  Fair    Frequency of exercise:  None    Do you usually eat at least 4 servings of fruit and vegetables a day, include whole grains    & fiber and avoid regularly eating high fat or \"junk\" foods?  No    Taking medications regularly:  Yes    Barriers to taking medications:  None    Medication side effects:  None    Ability to successfully perform activities of daily living:  No assistance needed    Home Safety:  Lack of grab bars in the bathroom    Hearing Impairment:  Difficulty following a conversation in a noisy restaurant or crowded room, feel that people are mumbling or not speaking clearly, need to ask people to speak up or repeat themselves and difficulty understanding soft or whispered speech    In the past 6 months, have you been bothered by leaking of urine? Yes    In general, how would " you rate your overall mental or emotional health?  Fair      PHQ-2 Total Score: 4    Additional concerns today:  No      Have you ever done Advance Care Planning? (For example, a Health Directive, POLST, or a discussion with a medical provider or your loved ones about your wishes): No, advance care planning information given to patient to review.  Patient declined advance care planning discussion at this time.       Fall risk  Fallen 2 or more times in the past year?: No  Any fall with injury in the past year?: No    Cognitive Screening   1) Repeat 3 items (Leader, Season, Table)    2) Clock draw: NORMAL  3) 3 item recall: Recalls 3 objects  Results: 3 items recalled: COGNITIVE IMPAIRMENT LESS LIKELY    Mini-CogTM Copyright S Cherie. Licensed by the author for use in [facility]; reprinted with permission (hamlet@[facility]). All rights reserved.      Do you have sleep apnea, excessive snoring or daytime drowsiness?: no    Reviewed and updated as needed this visit by clinical staff   Tobacco  Allergies  Meds   Med Hx  Surg Hx  Fam Hx  Soc Hx        Reviewed and updated as needed this visit by Provider                 Social History     Tobacco Use     Smoking status: Never     Passive exposure: Never     Smokeless tobacco: Never   Vaping Use     Vaping status: Never Used     Passive vaping exposure: Yes   Substance Use Topics     Alcohol use: Yes     Comment: occassionally             4/4/2023     8:22 AM   Alcohol Use   Prescreen: >3 drinks/day or >7 drinks/week? No          View : No data to display.              Do you have a current opioid prescription? No  Do you use any other controlled substances or medications that are not prescribed by a provider? Alcohol      Depression Screening Follow Up        4/4/2023     8:16 AM   PHQ   PHQ-9 Total Score 18   Q9: Thoughts of better off dead/self-harm past 2 weeks Several days   F/U: Thoughts of suicide or self-harm No   F/U: Safety concerns No          4/4/2023     8:16 AM   Last PHQ-9   1.  Little interest or pleasure in doing things 3   2.  Feeling down, depressed, or hopeless 2   3.  Trouble falling or staying asleep, or sleeping too much 3   4.  Feeling tired or having little energy 3   5.  Poor appetite or overeating 1   6.  Feeling bad about yourself 3   7.  Trouble concentrating 2   8.  Moving slowly or restless 0   Q9: Thoughts of better off dead/self-harm past 2 weeks 1   PHQ-9 Total Score 18   In the past two weeks have you had thoughts of suicide or self harm? No   Do you have concerns about your personal safety or the safety of others? No              View : No data to display.                  Follow Up      Follow Up Actions Taken  Crisis resource information provided in the After Visit Summary  Patient to follow up with PCP.  Clinic staff to schedule appointment if able.    Discussed the following ways the patient can remain in a safe environment:  remove alcohol, remove drugs and be around others  Depression Screening Follow-up        4/4/2023     8:16 AM   PHQ   PHQ-9 Total Score 18   Q9: Thoughts of better off dead/self-harm past 2 weeks Several days   F/U: Thoughts of suicide or self-harm No   F/U: Safety concerns No       Does the patient currently have a mental health provider?  No  Follow Up Actions Taken  Patient to follow up with PCP. Clinic staff to schedule appointment if able.     Current providers sharing in care for this patient include:   Patient Care Team:  Henrique Hartman MD as PCP - General (Family Medicine)  Tiffani Santos APRN CNP as Assigned PCP    The following health maintenance items are reviewed in Epic and correct as of today:  Health Maintenance   Topic Date Due     ANNUAL REVIEW OF  ORDERS  Never done     DEPRESSION ACTION PLAN  Never done     COVID-19 Vaccine (1) Never done     Pneumococcal Vaccine: 65+ Years (1 - PCV) Never done     AORTIC ANEURYSM SCREENING (SYSTEM ASSIGNED)  Never done     MEDICARE  "ANNUAL WELLNESS VISIT  12/05/2020     COLORECTAL CANCER SCREENING  12/19/2020     DTAP/TDAP/TD IMMUNIZATION (3 - Td or Tdap) 12/01/2021     INFLUENZA VACCINE (1) 09/01/2022     PHQ-9  10/04/2023     FALL RISK ASSESSMENT  04/04/2024     ADVANCE CARE PLANNING  12/05/2024     LIPID  12/19/2024     HEPATITIS C SCREENING  Completed     ZOSTER IMMUNIZATION  Completed     IPV IMMUNIZATION  Aged Out     MENINGITIS IMMUNIZATION  Aged Out       Review of Systems   Constitutional: Negative for chills and fever.   HENT: Negative for congestion, ear pain, hearing loss and sore throat.    Eyes: Negative for pain and visual disturbance.   Respiratory: Negative for cough and shortness of breath.    Cardiovascular: Negative for chest pain, palpitations and peripheral edema.   Gastrointestinal: Negative for abdominal pain, constipation, diarrhea, heartburn, hematochezia and nausea.   Endocrine: Negative.    Genitourinary: Positive for frequency, impotence and urgency. Negative for dysuria, genital sores, hematuria and penile discharge.   Musculoskeletal: Positive for arthralgias. Negative for joint swelling and myalgias.   Skin: Negative for rash.   Allergic/Immunologic: Negative.    Neurological: Positive for weakness. Negative for dizziness, headaches and paresthesias.   Hematological: Negative.    Psychiatric/Behavioral: Positive for mood changes. The patient is not nervous/anxious.      Constitutional, HEENT, cardiovascular, pulmonary, GI, , musculoskeletal, neuro, skin, endocrine and psych systems are negative, except as otherwise noted.    OBJECTIVE:   BP (!) 163/107 (BP Location: Right arm, Patient Position: Chair, Cuff Size: Adult Large)   Pulse 95   Temp 97.6  F (36.4  C) (Oral)   Resp 26   Ht 1.885 m (6' 2.21\")   Wt (!) 184.2 kg (406 lb)   SpO2 98%   BMI 51.83 kg/m   Estimated body mass index is 51.83 kg/m  as calculated from the following:    Height as of this encounter: 1.885 m (6' 2.21\").    Weight as of this " encounter: 184.2 kg (406 lb).  Physical Exam  GENERAL: healthy, alert and no distress  EYES: Eyes grossly normal to inspection, PERRL and conjunctivae and sclerae normal  HENT: ear canals and TM's normal, nose and mouth without ulcers or lesions  NECK: no adenopathy, no asymmetry, masses, or scars and thyroid normal to palpation  RESP: lungs clear to auscultation - no rales, rhonchi or wheezes  CV: regular rate and rhythm, normal S1 S2, no S3 or S4, no murmur, click or rub, no peripheral edema and peripheral pulses strong  ABDOMEN: soft, nontender, no hepatosplenomegaly, no masses and bowel sounds normal  MS: no gross musculoskeletal defects noted, no edema  SKIN: no suspicious lesions or rashes  NEURO: Normal strength and tone, mentation intact and speech normal  PSYCH: mentation appears normal, affect normal/bright    Diagnostic Test Results:  Labs reviewed in Epic  Orders Placed This Encounter   Procedures     REVIEW OF HEALTH MAINTENANCE PROTOCOL ORDERS     Pneumococcal 20 Valent Conjugate (PCV20)     PSA, screen     Comprehensive metabolic panel (BMP + Alb, Alk Phos, ALT, AST, Total. Bili, TP)     Lipid panel reflex to direct LDL Fasting     Hemoglobin A1c     CBC with platelets and differential     Adult Urology  Referral     CBC with platelets and differential     Lab Results   Component Value Date    A1C 7.4 04/04/2023    A1C 6.3 12/19/2019         ASSESSMENT / PLAN:   (Z00.00) Encounter for Medicare annual wellness exam  (primary encounter diagnosis)  Comment: normal exam  Plan: DISCONTINUED: Tdap, tetanus-diptheria-acell         pertussis, (BOOSTRIX) 5-2.5-18.5 LF-MCG/0.5         NAN injection         Discussed diet, exercise, wellness and other preventive recommendations related to health maintenance.  Fall risks precautions.   Follow up as needed for acute issues.    Physical exam recommended in one year.       (I10) Essential hypertension  Comment:   Plan: lisinopril (ZESTRIL) 20 MG tablet,          Comprehensive metabolic panel (BMP + Alb, Alk         Phos, ALT, AST, Total. Bili, TP), Lipid panel         reflex to direct LDL Fasting, CBC with         platelets and differential        Discussed diet and weight loss  Monitor blood pressure out side clinic  Follow up if remain elevated.    (N39.41) Urge incontinence of urine  Comment:   Plan: Adult Urology  Referral        Chronic, referred to Urology.    (I42.0) Dilated cardiomyopathy (H)  Comment:   Plan: stable, no symptoms,   S/P vaccinations,     (Z12.11) Screen for colon cancer  Comment:   Plan: COLOGUARD([employer])        screening    (R73.9) Elevated blood sugar  Comment:   Plan: Hemoglobin A1c            (E66.01) Morbid obesity (H)  Comment:   Plan: Discussed with patient diet, exercise, increase physical activity, avoid late meals.  Discussed with patient morbid obesity is a comorbidity for diabetes, worsening hypertension, sleep apnea, heart disease    (Z12.5) Screening for prostate cancer  Comment:   Plan: PSA, screen        screening    (E11.00) Type 2 diabetes mellitus with hyperosmolarity without coma, without long-term current use of insulin (H)  Comment:   Plan: metFORMIN (GLUCOPHAGE) 500 MG tablet        Advised with diet and weight loss    Concern for underlying depression.  Patient denies alcohol abuse, drugs abuse, denies suicidal thoughts or ideation.  Fully active working full-time.    Declined any intervention or medication at this point.  He feels better end of winter.      Patient has been advised of split billing requirements and indicates understanding: Yes      COUNSELING:  Reviewed preventive health counseling, as reflected in patient instructions       Regular exercise       Healthy diet/nutrition       Bladder control       Immunizations  Vaccinated for: Pneumococcal             Colon cancer screening       Prostate cancer screening      BMI:   Estimated body mass index is 51.83 kg/m  as calculated from the  "following:    Height as of this encounter: 1.885 m (6' 2.21\").    Weight as of this encounter: 184.2 kg (406 lb).   Weight management plan: Discussed healthy diet and exercise guidelines      He reports that he has never smoked. He has never been exposed to tobacco smoke. He has never used smokeless tobacco.      Appropriate preventive services were discussed with this patient, including applicable screening as appropriate for cardiovascular disease, diabetes, osteopenia/osteoporosis, and glaucoma.  As appropriate for age/gender, discussed screening for colorectal cancer, prostate cancer, breast cancer, and cervical cancer. Checklist reviewing preventive services available has been given to the patient.    Reviewed patients plan of care and provided an AVS. The Basic Care Plan (routine screening as documented in Health Maintenance) for [place] meets the Care Plan requirement. This Care Plan has been established and reviewed with the Patient.          Henrique Hartman MD  [facility]    Identified Health Risks:  I have reviewed Opioid Use Disorder and Substance Use Disorder risk factors and made any needed referrals.    Answers for HPI/ROS submitted by the patient on 4/4/2023  If you checked off any problems, how difficult have these problems made it for you to do your work, take care of things at home, or get along with other people?: Somewhat difficult  PHQ9 TOTAL SCORE: 18      "

## 2023-04-04 NOTE — TELEPHONE ENCOUNTER
----- Message from Henrique Hartman MD sent at 4/4/2023  9:34 AM CDT -----  Please call pt with A1c  At 7.4  This is diabetes  Please advise pt with diet and weight loss  Started Metformin, one tab daily for one wk, then one tab bid    Follow up in 3- 4 months.

## 2023-04-04 NOTE — TELEPHONE ENCOUNTER
RN left  for patient at [phone] requesting call back to clinic.    RN called to relay providers message and recommendations      Alexys Anna RN, BSN, PHN  [facility]

## 2023-04-05 ENCOUNTER — TELEPHONE (OUTPATIENT)
Dept: FAMILY MEDICINE | Facility: CLINIC | Age: 68
End: 2023-04-05
Payer: COMMERCIAL

## 2023-04-05 DIAGNOSIS — E78.5 DYSLIPIDEMIA: Primary | ICD-10-CM

## 2023-04-05 RX ORDER — ATORVASTATIN CALCIUM 20 MG/1
20 TABLET, FILM COATED ORAL AT BEDTIME
Qty: 90 TABLET | Refills: 3 | Status: SHIPPED | OUTPATIENT
Start: 2023-04-05 | End: 2024-04-10

## 2023-04-05 NOTE — TELEPHONE ENCOUNTER
RN called patient/family and relayed provider's message. Patient/family verbalized understanding.     Melinda Vilchis RN, BSN  [facility]: [place]

## 2023-04-05 NOTE — TELEPHONE ENCOUNTER
"----- Message from Henrique Hartman MD sent at 4/5/2023  9:52 AM CDT -----  Please call pt with results   Normal for total cholesterol  Elevated LDL    Patient has a very high risk to develop heart disease and stroke in the next 10 years, 46 % ( very high )  Therefore , I highly recommend patient start atorvastatin with the metformin.  I did send a prescription for atorvastatin 20 mg, and metformin if he decides to start taking the medication.  Advised with diet and weight loss.    Normal for PSA,   Calcium at higher side  Advise pt to cut down on calcium supplement.    Follow up in clinic in 6 months.    Ways to improve your cholesterol...     1- Eats less saturated fats (including avoiding \"trans\" fats), fatty foods.  Eat more baked food, than fried food.     2 - Eat more unsaturated fats  - found in vegetables, grains, and tree nuts.  Also by replacing butter with canola oil or olive oil.     3 - Eat more nuts.  1-2 ounces (a small handful) of almonds, walnuts, hazelnuts or pecans once a day in place of other less healthy snacks.     4 - Eat more high fiber foods - vegetables and whole grains including oat bran, oats, beans, peas, and flax seed.     5 - Eat more fish - such as salmon, tuna, mackerel, and sardines.  1 or 2 six ounce servings per week is a healthy replacement for other proteins.     6 - Exercise for at least 120 minutes per week - which is equal to 30 minutes 4 days per week.    7- avoid late meals, at least  a few hours before bedtime.       thanks    The 10-year ASCVD risk score (Sherita DK, et al., 2019) is: 46.2%    Values used to calculate the score:      Age: 67 years      Sex: Male      Is Non- : No      Diabetic: Yes      Tobacco smoker: No      Systolic Blood Pressure: 150 mmHg      Is BP treated: Yes      HDL Cholesterol: 33 mg/dL      Total Cholesterol: 188 mg/dL    "

## 2023-04-05 NOTE — TELEPHONE ENCOUNTER
Attempt #1 to call patient.     RN left voicemail and requested return call to [facility] at [phone].     Melinda Vilchis RN, BSN  [facility]: [place]

## 2023-05-14 LAB — NONINV COLON CA DNA+OCC BLD SCRN STL QL: NEGATIVE

## 2023-07-21 ENCOUNTER — OFFICE VISIT (OUTPATIENT)
Dept: UROLOGY | Facility: CLINIC | Age: 68
End: 2023-07-21
Payer: COMMERCIAL

## 2023-07-21 VITALS
BODY MASS INDEX: 51.83 KG/M2 | OXYGEN SATURATION: 99 % | DIASTOLIC BLOOD PRESSURE: 98 MMHG | WEIGHT: 315 LBS | HEART RATE: 64 BPM | RESPIRATION RATE: 28 BRPM | SYSTOLIC BLOOD PRESSURE: 143 MMHG

## 2023-07-21 DIAGNOSIS — N39.41 URGENCY INCONTINENCE: Primary | ICD-10-CM

## 2023-07-21 LAB
ALBUMIN UR-MCNC: 30 MG/DL
APPEARANCE UR: ABNORMAL
BACTERIA #/AREA URNS HPF: ABNORMAL /HPF
BILIRUB UR QL STRIP: NEGATIVE
COLOR UR AUTO: YELLOW
GLUCOSE UR STRIP-MCNC: NEGATIVE MG/DL
HGB UR QL STRIP: NEGATIVE
KETONES UR STRIP-MCNC: NEGATIVE MG/DL
LEUKOCYTE ESTERASE UR QL STRIP: NEGATIVE
MUCOUS THREADS #/AREA URNS LPF: PRESENT /LPF
NITRATE UR QL: NEGATIVE
PH UR STRIP: 5.5 [PH] (ref 5–7)
RBC #/AREA URNS AUTO: ABNORMAL /HPF
SP GR UR STRIP: >=1.03 (ref 1–1.03)
SQUAMOUS #/AREA URNS AUTO: ABNORMAL /LPF
UROBILINOGEN UR STRIP-ACNC: 1 E.U./DL
WBC #/AREA URNS AUTO: ABNORMAL /HPF

## 2023-07-21 PROCEDURE — 81001 URINALYSIS AUTO W/SCOPE: CPT | Performed by: STUDENT IN AN ORGANIZED HEALTH CARE EDUCATION/TRAINING PROGRAM

## 2023-07-21 PROCEDURE — 51798 US URINE CAPACITY MEASURE: CPT | Performed by: STUDENT IN AN ORGANIZED HEALTH CARE EDUCATION/TRAINING PROGRAM

## 2023-07-21 PROCEDURE — 99204 OFFICE O/P NEW MOD 45 MIN: CPT | Mod: 25 | Performed by: STUDENT IN AN ORGANIZED HEALTH CARE EDUCATION/TRAINING PROGRAM

## 2023-07-21 RX ORDER — SOLIFENACIN SUCCINATE 5 MG/1
5 TABLET, FILM COATED ORAL DAILY
Qty: 90 TABLET | Refills: 1 | Status: SHIPPED | OUTPATIENT
Start: 2023-07-21 | End: 2024-01-15

## 2023-07-21 NOTE — PROGRESS NOTES
Chief Complaint:   Urinary incontinence         History of Present Illness:   Malcom Coates is a 67 year old male with a history of dilated cardiomyopathy, T2 DM, osteoarthritis, and major depressive disorder who presents for evaluation of urinary incontinence.    He reports a several year history of urinary incontinence, sometimes urge, sometimes without awareness. This has become more of a problem, specifically at work, in the last few months. The patient is a . He denies weak urinary stream, sensation of incomplete bladder emptying, gross hematuria, and dysuria.     He reports looser bowel movements since starting Metformin.     He drinks water during the day. He occasionally drinks coffee.     His last PSA was 2.92 ng/mL on 4/04/2023.          Past Medical History:     Past Medical History:   Diagnosis Date     Cardiomyopathy (H) 2001    viral      DJD (degenerative joint disease) of knee      HTN (hypertension)      Hypertension goal BP (blood pressure) < 140/90 12/1/2011     Leg fracture, left      Moderate major depression (H) 12/1/2011     Obesity 12/1/2011     Osteoarthritis (arthritis due to wear and tear of joints) 12/1/2011            Past Surgical History:     Past Surgical History:   Procedure Laterality Date     HC CLOSED TX SHOULDER DISLOC W MANIP NO ANESTH      LT.     ZZC OPEN TX TIBIAL FRACTURE PROXIMAL UNICONDYLAR  1976    left             Medications     Current Outpatient Medications   Medication     atorvastatin (LIPITOR) 20 MG tablet     lisinopril (ZESTRIL) 20 MG tablet     metFORMIN (GLUCOPHAGE) 500 MG tablet     naproxen (NAPROSYN) 250 MG tablet     No current facility-administered medications for this visit.            Allergies:   Patient has no known allergies.         Review of Systems:  From intake questionnaire   Negative 14 system review except as noted on HPI, nurse's note.         Physical Exam:   Patient is a 67 year old  male   Vitals: Blood pressure (!)  143/98, pulse 64, resp. rate 28, weight (!) 184.2 kg (406 lb), SpO2 99 %.  General Appearance Adult: Alert, no acute distress, oriented.  Lungs: Non-labored breathing.  Heart: No obvious jugular venous distension present.  Neuro: Alert, oriented, speech and mentation normal    PVR: 0 mL      Labs and Pathology:    I personally reviewed all applicable laboratory data and went over findings with patient  Significant for:    CBC RESULTS:  Recent Labs   Lab Test 04/04/23 0915   WBC 6.8   HGB 15.9           BMP RESULTS:  Recent Labs   Lab Test 04/04/23 0915 12/19/19  1022    140   POTASSIUM 5.0 4.7   CHLORIDE 100 106   CO2 27 30   ANIONGAP 13 4   * 122*   BUN 20.5 25   CR 1.13 0.94   GFRESTIMATED 71 85   GFRESTBLACK  --  >90   ALBINO 10.3* 9.2     PSA RESULTS  PSA   Date Value Ref Range Status   12/19/2019 1.94 0 - 4 ug/L Final     Comment:     Assay Method:  Chemiluminescence using Siemens Vista analyzer   12/01/2011 0.78 0 - 4 ug/L Final     Prostate Specific Antigen Screen   Date Value Ref Range Status   04/04/2023 2.92 0.00 - 4.50 ng/mL Final            Assessment and Plan:     Assessment: 67 year old male seen in evaluation for urinary incontinence, sometimes urge, sometimes without awareness. This has become more of a problem, specifically at work, in the last few months. The patient is a . He denies weak urinary stream, sensation of incomplete bladder emptying, gross hematuria, and dysuria.     We discussed a trial of an anticholinergic medication to start. The patient is in agreement and is very motivated to control his symptoms. We also reviewed bladder irritants.     Plan:  1. Start solifenacin 5 mg daily. Side effects reviewed.   2. Follow up in three months. Patient was advised to call sooner if symptoms are not improving so he can get back to work.     KYLIE GRIGSBY PA-C  Department of Urology

## 2023-07-21 NOTE — NURSING NOTE
Chief Complaint   Patient presents with     Incontinence     Patient here to discus incontinence and how this issue is interfering with work.        There were no vitals filed for this visit.  Wt Readings from Last 1 Encounters:   04/04/23 (!) 184.2 kg (406 lb)     Sridevi Stevens MA    Active order to obtain bladder scan? Yes   Name of ordering provider:  Sherrell Fields  Bladder scan preformed post void yes.  Bladder scan reveled 0ML  Provider notified?  Yes    Rodney Laureano

## 2023-08-02 ENCOUNTER — TELEPHONE (OUTPATIENT)
Dept: UROLOGY | Facility: CLINIC | Age: 68
End: 2023-08-02
Payer: COMMERCIAL

## 2023-08-02 NOTE — LETTER
August 2, 2023      Malcom Coates  [address] [address] [facility] [address]        To Whom It May Concern:    Malcom Coates was seen in our clinic. He may return to work with the following: no restrictions on or about 8/2/23 .      Sincerely,          KYLIE GRIGSBY PA-C

## 2023-08-02 NOTE — TELEPHONE ENCOUNTER
Talked with Dr. Appiah and he okayed the letter.  Letter was printed and put at the .  Patient was called and informed it is at the desk.    Colette Frazier LPN on 8/2/2023 at 3:05 PM

## 2023-08-02 NOTE — TELEPHONE ENCOUNTER
Reason for Call:  Other     Detailed comments: Patient requesting a return to work note-returning today. Patient states he has been taking his medication for a week and a half and it is working as anticipated. Patient would like to  letter this afternoon. Patient also available on [employer].     Phone Number Patient can be reached at: Home number on file [phone] (home)    Best Time: any    Can we leave a detailed message on this number? YES    Call taken on 8/2/2023 at 11:52 AM by Bonnie Rai

## 2023-08-04 ENCOUNTER — ALLIED HEALTH/NURSE VISIT (OUTPATIENT)
Dept: EDUCATION SERVICES | Facility: CLINIC | Age: 68
End: 2023-08-04
Attending: FAMILY MEDICINE
Payer: COMMERCIAL

## 2023-08-04 DIAGNOSIS — E11.00 TYPE 2 DIABETES MELLITUS WITH HYPEROSMOLARITY WITHOUT COMA, WITHOUT LONG-TERM CURRENT USE OF INSULIN (H): ICD-10-CM

## 2023-08-04 DIAGNOSIS — E66.01 MORBID OBESITY (H): ICD-10-CM

## 2023-08-04 PROCEDURE — G0108 DIAB MANAGE TRN  PER INDIV: HCPCS | Performed by: DIETITIAN, REGISTERED

## 2023-08-04 NOTE — PATIENT INSTRUCTIONS
Schedule follow up with Dr. Hartman and have A1C drawn.    2.  Schedule annual eye exam.    3.  Schedule dental exam and cleaning.     4.  Food/Meal Planning   -aim for 45-60 grams carbohydrate per meal   -up to 15 grams carbohydrate per snack      -use resources to count carbohydrates: food labels, book, Calorie Magdi or MyFitnessPal web sites or apps     -snack ideas:   an individual container of Greek yogurt (try Chobani Less Sugar)  whole grain crackers and a stick of string cheese  chocolate fairlife milk  a Kashi or KIND bar  a baseball size piece of whole fruit + nut butter (apple + peanut butter)  fruit canned in it's own juice + cottage cheese    5.  Please call or send a [employer] message with any questions or concerns or next A1C is 7% or above.    Trcay Wheeler, MPH, RD, [facility] PERDOMO  Diabetes Education Triage Line: [phone]  Diabetes Education Appointment [phone]

## 2023-08-04 NOTE — LETTER
8/4/2023         RE: Malcom Coates  [address]  [place] [address]        Dear Colleague,    Thank you for referring your patient, Malcom Coates, to the [facility]. Please see a copy of my visit note below.    Diabetes Self-Management Education & Support    Presents for: Initial Assessment for new diagnosis    Type of Service: In Person Visit    Assessment Type:   ASSESSMENT:  Diagnosed with diabetes in April 2023.  Discussed pathophysiology of type 2 diabetes. Discussed risk factors for type 2 diabetes. Discussed what A1C measures and goal. Discussed action and benefits of metformin.  Encouraged annual eye exam, annual foot exam, and dental cleaning and exams twice a year.  Introduced the Plate method, label reading, and carbohydrate counting.  Recommended a meal or snack every 4-6 hours while awake.  Declined to schedule follow up visit.  Believe patient would benefit from a discussion of glucose monitoring and heart healthy eating in the future.      Patient's most recent   Lab Results   Component Value Date    A1C 7.4 04/04/2023    A1C 6.3 12/19/2019     is not meeting goal of <7.0    Diabetes knowledge and skills assessment:   Patient is knowledgeable in diabetes management concepts related to: Taking Medication    Continue education with the following diabetes management concepts: Healthy Eating, Being Active, Monitoring, and Reducing Risks    Based on learning assessment above, most appropriate setting for further diabetes education would be: Group class or Individual setting.      PLAN    chedule follow up with Dr. Hartman and have A1C drawn.    2.  Schedule annual eye exam.    3.  Schedule dental exam and cleaning.     4.  Food/Meal Planning   -aim for 45-60 grams carbohydrate per meal   -up to 15 grams carbohydrate per snack      -use resources to count carbohydrates: food labels, book, Calorie Magdi or MyFitnessPal web sites or apps     -snack ideas:    an individual container of Greek yogurt (try Chobani Less Sugar)  whole grain crackers and a stick of string cheese  chocolate fairlife milk  a Kashi or KIND bar  a baseball size piece of whole fruit + nut butter (apple + peanut butter)  fruit canned in it's own juice + cottage cheese    5.  Please call or send a [employer] message with any questions or concerns or next A1C is 7% or above.    Topics to cover at upcoming visits: Healthy Eating, Being Active, Monitoring, and Reducing Risks    Follow-up: declined    See Care Plan for co-developed, patient-state behavior change goals.  AVS provided for patient today.    Education Materials Provided:   [employer] Understanding Diabetes Booklet and My Plate Planner      SUBJECTIVE/OBJECTIVE:  Presents for: Initial Assessment for new diagnosis  Accompanied by: Self  Diabetes education in the past 24mo: No  Focus of Visit: Assistance w/ making life changes  Diabetes type: Type 2  Date of diagnosis: 4/4/23  Disease course: Stable  How confident are you filling out medical forms by yourself:: Quite a bit  Transportation concerns: No  Difficulty affording diabetes medication?: No  Difficulty affording diabetes testing supplies?:  (n/a)  Other concerns:: Glasses  Cultural Influences/Ethnic Background:  Not  or     Diabetes Symptoms & Complications:  Fatigue: Sometimes  Neuropathy: Yes (most promintently in feet, some in fingers.  Uses an over the counter cream which helps)  Polydipsia: Sometimes  Polyphagia: Sometimes  Polyuria: Yes (working with urology)  Visual change: No  Slow healing wounds: No  Weight trend: Fluctuating  Complications assessed today?: Yes  Autonomic neuropathy: No  CVA: No  Heart disease: No  Nephropathy: No  Peripheral neuropathy: Yes  Peripheral Vascular Disease: No  Retinopathy: No    Patient Problem List and Family Medical History reviewed for relevant medical history, current medical status, and diabetes risk  "factors.    Vitals:  There were no vitals taken for this visit.  Estimated body mass index is 51.83 kg/m  as calculated from the following:    Height as of 4/4/23: 1.885 m (6' 2.21\").    Weight as of 7/21/23: 184.2 kg (406 lb).   Last 3 BP:   BP Readings from Last 3 Encounters:   07/21/23 (!) 143/98   04/04/23 (!) 150/90   12/19/19 138/81       History   Smoking Status     Never   Smokeless Tobacco     Never       Labs:  Lab Results   Component Value Date    A1C 7.4 04/04/2023    A1C 6.3 12/19/2019     Lab Results   Component Value Date     04/04/2023     12/19/2019     Lab Results   Component Value Date     04/04/2023     12/19/2019     HDL Cholesterol   Date Value Ref Range Status   12/19/2019 37 (L) >39 mg/dL Final     Direct Measure HDL   Date Value Ref Range Status   04/04/2023 33 (L) >=40 mg/dL Final   ]  GFR Estimate   Date Value Ref Range Status   04/04/2023 71 >60 mL/min/1.73m2 Final     Comment:     eGFR calculated using 2021 CKD-EPI equation.   12/19/2019 85 >60 mL/min/[1.73_m2] Final     Comment:     Non  GFR Calc  Starting 12/18/2018, serum creatinine based estimated GFR (eGFR) will be   calculated using the Chronic Kidney Disease Epidemiology Collaboration   (CKD-EPI) equation.       GFR Estimate If Black   Date Value Ref Range Status   12/19/2019 >90 >60 mL/min/[1.73_m2] Final     Comment:      GFR Calc  Starting 12/18/2018, serum creatinine based estimated GFR (eGFR) will be   calculated using the Chronic Kidney Disease Epidemiology Collaboration   (CKD-EPI) equation.       Lab Results   Component Value Date    CR 1.13 04/04/2023    CR 0.94 12/19/2019     No results found for: MICROALBUMIN    Healthy Eating:  Healthy Eating Assessed Today: Yes  Cultural/Scientologist diet restrictions?: No  Meal planning/habits: Avoiding sweets, Low salt  How many times a week on average do you eat food made away from home (restaurant/take-out)?: 5+  Breakfast: " skips OR homemade breakfast sandwich OR out ([employer] - 2 breakfast sandwiches OR pancakes and eggs with sugar free syrup), water  Lunch: skips  Dinner: [employer] OR [employer] or fast food (eggs, hashbrowns, toast), coffee or diet soda  Snacks: occasionally HS snack  Beverages: Coffee, Water, Diet soda, Alcohol (alcohol = very seldom (holidays))  Has patient met with a dietitian in the past?: No    Being Active:  Being Active Assessed Today: Yes  Exercise:: Currently not exercising  Barrier to exercise: Time, Access, Physical limitation (uses cane for ambulation)    Monitoring:  Monitoring Assessed Today: Yes  Did patient bring glucose meter to appointment? : No  Times checking blood sugar at home (number): Never    Taking Medications:  Diabetes Medication(s)       Biguanides       metFORMIN (GLUCOPHAGE) 500 MG tablet    Take 1 tablet (500 mg) by mouth 2 times daily (with meals)            Taking Medication Assessed Today: Yes  Current Treatments: Oral Medication (taken by mouth), Diet  Problems taking diabetes medications regularly?: No  Diabetes medication side effects?: Yes (diarrhea initially which has resolved.  Does have some constipation)    Problem Solving:  Problem Solving Assessed Today: Yes  Is the patient at risk for hypoglycemia?: No    Reducing Risks:  Reducing Risks Assessed Today: Yes  Diabetes Risks: Age over 45 years, Sedentary Lifestyle, Family History  CAD Risks: Hypertension, Male sex, Obesity, Sedentary lifestyle, Diabetes Mellitus  Has dilated eye exam at least once a year?: No  Sees dentist every 6 months?: No  Feet checked by healthcare provider in the last year?: No    Healthy Coping:  Healthy Coping Assessed Today: Yes  Emotional response to diabetes: Ready to learn  Informal Support system:: Children  Patient Activation Measure Survey Score:       No data to display                  Care Plan and Education Provided:  Care Plan: Diabetes   Updates made by Lenora Wheeler RD since  8/4/2023 12:00 AM        Problem: HbA1C Not In Goal         Goal: Establish Regular Follow-Ups with PCP         Task: Discuss with PCP the recommended timing for patient's next follow up visit(s)    Responsible User: Lenora Wheeler RD        Task: Discuss schedule for PCP visits with patient    Responsible User: Lenora Wheeler RD        Goal: Get HbA1C Level in Goal         Task: Educate patient on diabetes education self-management topics    Responsible User: Lenora Wheeler RD        Task: Educate patient on benefits of regular glucose monitoring    Responsible User: Lenora Wheeler RD        Task: Refer patient to appropriate extended care team member, as needed (Medication Therapy Management, Behavioral Health, Physical Therapy, etc.)    Responsible User: Lenora Wheeler RD        Task: Discuss diabetes treatment plan with patient    Responsible User: Lenora Wheeler RD        Problem: Diabetes Self-Management Education Needed to Optimize Self-Care Behaviors         Goal: Understand diabetes pathophysiology and disease progression         Task: Provide education on diabetes pathophysiology and disease progression specfic to patient's diabetes type Completed 8/4/2023   Responsible User: Lenora Wheeler RD        Goal: Healthy Eating - follow a healthy eating pattern for diabetes    This Visit's Progress: 0%   Note:    Have 45-60 grams of carbohydrate per meal.        Task: Provide education on portion control and consistency in amount, composition and timing of food intake    Responsible User: Lenora Wheeler RD        Task: Provide education on managing carbohydrate intake (carbohydrate counting, plate planning method, etc.) Completed 8/4/2023   Responsible User: Lenora Wheeler RD        Task: Provide education on weight management    Responsible User: Leonra Wheeler RD        Task: Provide education on heart healthy eating    Responsible User: Liam  FARHAT Cooley        Task: Provide education on eating out    Responsible User: Lenora Wheeler RD        Task: Develop individualized healthy eating plan with patient    Responsible User: Lenora Wheeler RD        Goal: Being Active - get regular physical activity, working up to at least 150 minutes per week         Task: Provide education on relationship of activity to glucose and precautions to take if at risk for low glucose    Responsible User: Lenora Wheeler RD        Task: Discuss barriers to physical activity with patient    Responsible User: Lenora Wheeler RD        Task: Develop physical activity plan with patient    Responsible User: Lenora Wheeler RD        Task: Explore community resources including walking groups, assistance programs, and home videos    Responsible User: Lenora Wheeler RD        Goal: Monitoring - monitor glucose and ketones as directed         Task: Provide education on blood glucose monitoring (purpose, proper technique, frequency, glucose targets, interpreting results, when to use glucose control solution, sharps disposal)    Responsible User: Lenora Wheeler RD        Task: Provide education on continuous glucose monitoring (sensor placement, use of mitchell or /reader, understanding glucose trends, alerts and alarms, differences between sensor glucose and blood glucose)    Responsible User: Lenora Wheeler RD        Task: Provide education on ketone monitoring (when to monitor, frequency, etc.)    Responsible User: Lenora Wheeler RD        Goal: Taking Medication - patient is consistently taking medications as directed         Task: Provide education on action of prescribed medication, including when to take and possible side effects Completed 8/4/2023   Responsible User: Lenora Wheeler RD        Task: Provide education on insulin and injectable diabetes medications, including administration, storage, site selection and  rotation for injection sites    Responsible User: Lenora Wheeler RD        Task: Discuss barriers to medication adherence with patient and provide management technique ideas as appropriate    Responsible User: Lenora Wheeler RD        Task: Provide education on frequency and refill details of medications    Responsible User: Lenora Wheeler RD        Goal: Problem Solving - know how to prevent and manage short-term diabetes complications         Task: Provide education on high blood glucose - causes, signs/symptoms, prevention and treatment    Responsible User: Lenora Wheeler RD        Task: Provide education on low blood glucose - causes, signs/symptoms, prevention, treatment, carrying a carbohydrate source at all times, and medical identification    Responsible User: Lenora Wheeler RD        Task: Provide education on safe travel with diabetes    Responsible User: Lenora Wheeler RD        Task: Provide education on how to care for diabetes on sick days    Responsible User: Lenora Wheeler RD        Task: Provide education on when to call a health care provider    Responsible User: Lenora Wheeler RD        Goal: Reducing Risks - know how to prevent and treat long-term diabetes complications         Task: Provide education on major complications of diabetes, prevention, early diagnostic measures and treatment of complications    Responsible User: Lenora Wheeler RD        Task: Provide education on recommended care for dental, eye and foot health Completed 8/4/2023   Responsible User: Lenora Wheeler RD        Task: Provide education on Hemoglobin A1c - goals and relationship to blood glucose levels    Responsible User: Lenora Wheeler RD        Task: Provide education on recommendations for heart health - lipid levels and goals, blood pressure and goals, and aspirin therapy, if indicated    Responsible User: Lenora Wheeler RD        Task: Provide  education on tobacco cessation    Responsible User: Lenora Wheeler RD        Goal: Healthy Coping - use available resources to cope with the challenges of managing diabetes         Task: Discuss recognizing feelings about having diabetes    Responsible User: Lenora Wheeler RD        Task: Provide education on the benefits of making appropriate lifestyle changes    Responsible User: Lenora Wheeler RD        Task: Provide education on benefits of utilizing support systems    Responsible User: Lenora Wheeler RD        Task: Discuss methods for coping with stress    Responsible User: Lenora Wheeler RD        Task: Provide education on when to seek professional counseling    Responsible User: Lenora Wheeler RD Beth Reisdorf, MPH, RD, CDCES, LD 8/4/2023      Time Spent: 60 minutes  Encounter Type: Individual    Any diabetes medication dose changes were made via the CDE Protocol per the patient's referring provider. A copy of this encounter was shared with the provider.

## 2023-08-04 NOTE — PROGRESS NOTES
Diabetes Self-Management Education & Support    Presents for: Initial Assessment for new diagnosis    Type of Service: In Person Visit    Assessment Type:   ASSESSMENT:  Diagnosed with diabetes in April 2023.  Discussed pathophysiology of type 2 diabetes. Discussed risk factors for type 2 diabetes. Discussed what A1C measures and goal. Discussed action and benefits of metformin.  Encouraged annual eye exam, annual foot exam, and dental cleaning and exams twice a year.  Introduced the Plate method, label reading, and carbohydrate counting.  Recommended a meal or snack every 4-6 hours while awake.  Declined to schedule follow up visit.  Believe patient would benefit from a discussion of glucose monitoring and heart healthy eating in the future.      Patient's most recent   Lab Results   Component Value Date    A1C 7.4 04/04/2023    A1C 6.3 12/19/2019     is not meeting goal of <7.0    Diabetes knowledge and skills assessment:   Patient is knowledgeable in diabetes management concepts related to: Taking Medication    Continue education with the following diabetes management concepts: Healthy Eating, Being Active, Monitoring, and Reducing Risks    Based on learning assessment above, most appropriate setting for further diabetes education would be: Group class or Individual setting.      PLAN    chedule follow up with Dr. Hartman and have A1C drawn.    2.  Schedule annual eye exam.    3.  Schedule dental exam and cleaning.     4.  Food/Meal Planning   -aim for 45-60 grams carbohydrate per meal   -up to 15 grams carbohydrate per snack      -use resources to count carbohydrates: food labels, book, Calorie Magdi or MyFitnessPal web sites or apps     -snack ideas:   an individual container of Greek yogurt (try Chobani Less Sugar)  whole grain crackers and a stick of string cheese  chocolate fairlife milk  a Kashi or KIND bar  a baseball size piece of whole fruit + nut butter (apple + peanut butter)  fruit canned in it's own  "juice + cottage cheese    5.  Please call or send a Tynker message with any questions or concerns or next A1C is 7% or above.    Topics to cover at upcoming visits: Healthy Eating, Being Active, Monitoring, and Reducing Risks    Follow-up: declined    See Care Plan for co-developed, patient-state behavior change goals.  AVS provided for patient today.    Education Materials Provided:  MESERET Ooyala Mayito Understanding Diabetes Booklet and My Plate Planner      SUBJECTIVE/OBJECTIVE:  Presents for: Initial Assessment for new diagnosis  Accompanied by: Self  Diabetes education in the past 24mo: No  Focus of Visit: Assistance w/ making life changes  Diabetes type: Type 2  Date of diagnosis: 4/4/23  Disease course: Stable  How confident are you filling out medical forms by yourself:: Quite a bit  Transportation concerns: No  Difficulty affording diabetes medication?: No  Difficulty affording diabetes testing supplies?:  (n/a)  Other concerns:: Glasses  Cultural Influences/Ethnic Background:  Not  or     Diabetes Symptoms & Complications:  Fatigue: Sometimes  Neuropathy: Yes (most promintently in feet, some in fingers.  Uses an over the counter cream which helps)  Polydipsia: Sometimes  Polyphagia: Sometimes  Polyuria: Yes (working with urology)  Visual change: No  Slow healing wounds: No  Weight trend: Fluctuating  Complications assessed today?: Yes  Autonomic neuropathy: No  CVA: No  Heart disease: No  Nephropathy: No  Peripheral neuropathy: Yes  Peripheral Vascular Disease: No  Retinopathy: No    Patient Problem List and Family Medical History reviewed for relevant medical history, current medical status, and diabetes risk factors.    Vitals:  There were no vitals taken for this visit.  Estimated body mass index is 51.83 kg/m  as calculated from the following:    Height as of 4/4/23: 1.885 m (6' 2.21\").    Weight as of 7/21/23: 184.2 kg (406 lb).   Last 3 BP:   BP Readings from Last 3 Encounters: "   07/21/23 (!) 143/98   04/04/23 (!) 150/90   12/19/19 138/81       History   Smoking Status    Never   Smokeless Tobacco    Never       Labs:  Lab Results   Component Value Date    A1C 7.4 04/04/2023    A1C 6.3 12/19/2019     Lab Results   Component Value Date     04/04/2023     12/19/2019     Lab Results   Component Value Date     04/04/2023     12/19/2019     HDL Cholesterol   Date Value Ref Range Status   12/19/2019 37 (L) >39 mg/dL Final     Direct Measure HDL   Date Value Ref Range Status   04/04/2023 33 (L) >=40 mg/dL Final   ]  GFR Estimate   Date Value Ref Range Status   04/04/2023 71 >60 mL/min/1.73m2 Final     Comment:     eGFR calculated using 2021 CKD-EPI equation.   12/19/2019 85 >60 mL/min/[1.73_m2] Final     Comment:     Non  GFR Calc  Starting 12/18/2018, serum creatinine based estimated GFR (eGFR) will be   calculated using the Chronic Kidney Disease Epidemiology Collaboration   (CKD-EPI) equation.       GFR Estimate If Black   Date Value Ref Range Status   12/19/2019 >90 >60 mL/min/[1.73_m2] Final     Comment:      GFR Calc  Starting 12/18/2018, serum creatinine based estimated GFR (eGFR) will be   calculated using the Chronic Kidney Disease Epidemiology Collaboration   (CKD-EPI) equation.       Lab Results   Component Value Date    CR 1.13 04/04/2023    CR 0.94 12/19/2019     No results found for: MICROALBUMIN    Healthy Eating:  Healthy Eating Assessed Today: Yes  Cultural/Mosque diet restrictions?: No  Meal planning/habits: Avoiding sweets, Low salt  How many times a week on average do you eat food made away from home (restaurant/take-out)?: 5+  Breakfast: skips OR homemade breakfast sandwich OR out ([employer] - 2 breakfast sandwiches OR pancakes and eggs with sugar free syrup), water  Lunch: skips  Dinner: [employer] OR [employer] or fast food (eggs, hashbrowns, toast), coffee or diet soda  Snacks: occasionally HS snack  Beverages:  Coffee, Water, Diet soda, Alcohol (alcohol = very seldom (holidays))  Has patient met with a dietitian in the past?: No    Being Active:  Being Active Assessed Today: Yes  Exercise:: Currently not exercising  Barrier to exercise: Time, Access, Physical limitation (uses cane for ambulation)    Monitoring:  Monitoring Assessed Today: Yes  Did patient bring glucose meter to appointment? : No  Times checking blood sugar at home (number): Never    Taking Medications:  Diabetes Medication(s)       Biguanides       metFORMIN (GLUCOPHAGE) 500 MG tablet    Take 1 tablet (500 mg) by mouth 2 times daily (with meals)            Taking Medication Assessed Today: Yes  Current Treatments: Oral Medication (taken by mouth), Diet  Problems taking diabetes medications regularly?: No  Diabetes medication side effects?: Yes (diarrhea initially which has resolved.  Does have some constipation)    Problem Solving:  Problem Solving Assessed Today: Yes  Is the patient at risk for hypoglycemia?: No    Reducing Risks:  Reducing Risks Assessed Today: Yes  Diabetes Risks: Age over 45 years, Sedentary Lifestyle, Family History  CAD Risks: Hypertension, Male sex, Obesity, Sedentary lifestyle, Diabetes Mellitus  Has dilated eye exam at least once a year?: No  Sees dentist every 6 months?: No  Feet checked by healthcare provider in the last year?: No    Healthy Coping:  Healthy Coping Assessed Today: Yes  Emotional response to diabetes: Ready to learn  Informal Support system:: Children  Patient Activation Measure Survey Score:       No data to display                  Care Plan and Education Provided:  Care Plan: Diabetes   Updates made by Lenora Wheeler RD since 8/4/2023 12:00 AM        Problem: HbA1C Not In Goal         Goal: Establish Regular Follow-Ups with PCP         Task: Discuss with PCP the recommended timing for patient's next follow up visit(s)    Responsible User: Lenora Wheeler RD        Task: Discuss schedule for PCP  visits with patient    Responsible User: Lenora Wheeler RD        Goal: Get HbA1C Level in Goal         Task: Educate patient on diabetes education self-management topics    Responsible User: Lenora Wheeler RD        Task: Educate patient on benefits of regular glucose monitoring    Responsible User: Lenora Wheeler RD        Task: Refer patient to appropriate extended care team member, as needed (Medication Therapy Management, Behavioral Health, Physical Therapy, etc.)    Responsible User: Lenora Wheeler RD        Task: Discuss diabetes treatment plan with patient    Responsible User: Lenora Wheeler RD        Problem: Diabetes Self-Management Education Needed to Optimize Self-Care Behaviors         Goal: Understand diabetes pathophysiology and disease progression         Task: Provide education on diabetes pathophysiology and disease progression specfic to patient's diabetes type Completed 8/4/2023   Responsible User: Lenora Wheeler RD        Goal: Healthy Eating - follow a healthy eating pattern for diabetes    This Visit's Progress: 0%   Note:    Have 45-60 grams of carbohydrate per meal.        Task: Provide education on portion control and consistency in amount, composition and timing of food intake    Responsible User: Lenora Wheeler RD        Task: Provide education on managing carbohydrate intake (carbohydrate counting, plate planning method, etc.) Completed 8/4/2023   Responsible User: Lneora Wheeler RD        Task: Provide education on weight management    Responsible User: Lenora Wheeler RD        Task: Provide education on heart healthy eating    Responsible User: Lenora Wheeler RD        Task: Provide education on eating out    Responsible User: Lenora Wheeler RD        Task: Develop individualized healthy eating plan with patient    Responsible User: Lenora Wheeler RD        Goal: Being Active - get regular physical activity,  working up to at least 150 minutes per week         Task: Provide education on relationship of activity to glucose and precautions to take if at risk for low glucose    Responsible User: Lenora Wheeler RD        Task: Discuss barriers to physical activity with patient    Responsible User: Lenora Wheeler RD        Task: Develop physical activity plan with patient    Responsible User: Lenora Wheeler RD        Task: Explore community resources including walking groups, assistance programs, and home videos    Responsible User: Lenora Wheeler RD        Goal: Monitoring - monitor glucose and ketones as directed         Task: Provide education on blood glucose monitoring (purpose, proper technique, frequency, glucose targets, interpreting results, when to use glucose control solution, sharps disposal)    Responsible User: Lenora Wheeler RD        Task: Provide education on continuous glucose monitoring (sensor placement, use of mitchell or /reader, understanding glucose trends, alerts and alarms, differences between sensor glucose and blood glucose)    Responsible User: Lenora Wheeler RD        Task: Provide education on ketone monitoring (when to monitor, frequency, etc.)    Responsible User: Lenora Wheeler RD        Goal: Taking Medication - patient is consistently taking medications as directed         Task: Provide education on action of prescribed medication, including when to take and possible side effects Completed 8/4/2023   Responsible User: Lenora Wheeler RD        Task: Provide education on insulin and injectable diabetes medications, including administration, storage, site selection and rotation for injection sites    Responsible User: Lenora Wheeler RD        Task: Discuss barriers to medication adherence with patient and provide management technique ideas as appropriate    Responsible User: Lenora Wheeler RD        Task: Provide education on  frequency and refill details of medications    Responsible User: Lenora Wheeler RD        Goal: Problem Solving - know how to prevent and manage short-term diabetes complications         Task: Provide education on high blood glucose - causes, signs/symptoms, prevention and treatment    Responsible User: Lenora Wheeler RD        Task: Provide education on low blood glucose - causes, signs/symptoms, prevention, treatment, carrying a carbohydrate source at all times, and medical identification    Responsible User: Lenora Wheeler RD        Task: Provide education on safe travel with diabetes    Responsible User: Lenora Wheeler RD        Task: Provide education on how to care for diabetes on sick days    Responsible User: Lenora Wheeler RD        Task: Provide education on when to call a health care provider    Responsible User: Lenora Wheeler RD        Goal: Reducing Risks - know how to prevent and treat long-term diabetes complications         Task: Provide education on major complications of diabetes, prevention, early diagnostic measures and treatment of complications    Responsible User: Lenora Wheeler RD        Task: Provide education on recommended care for dental, eye and foot health Completed 8/4/2023   Responsible User: Lenora Wheeler RD        Task: Provide education on Hemoglobin A1c - goals and relationship to blood glucose levels    Responsible User: Lenora Wheeler RD        Task: Provide education on recommendations for heart health - lipid levels and goals, blood pressure and goals, and aspirin therapy, if indicated    Responsible User: Lenora Wheeler RD        Task: Provide education on tobacco cessation    Responsible User: Lenora Wheeler RD        Goal: Healthy Coping - use available resources to cope with the challenges of managing diabetes         Task: Discuss recognizing feelings about having diabetes    Responsible User: Liam  FARHAT Cooley        Task: Provide education on the benefits of making appropriate lifestyle changes    Responsible User: Lenora Wheeler RD        Task: Provide education on benefits of utilizing support systems    Responsible User: Lenora Wheeler RD        Task: Discuss methods for coping with stress    Responsible User: Lenora Wheeler RD        Task: Provide education on when to seek professional counseling    Responsible User: Lenora Wheeler RD Beth Reisdorf, MPH, RD, CDCES, LD 8/4/2023      Time Spent: 60 minutes  Encounter Type: Individual    Any diabetes medication dose changes were made via the CDE Protocol per the patient's referring provider. A copy of this encounter was shared with the provider.

## 2023-09-09 ENCOUNTER — HEALTH MAINTENANCE LETTER (OUTPATIENT)
Age: 68
End: 2023-09-09

## 2024-01-15 DIAGNOSIS — N39.41 URGENCY INCONTINENCE: ICD-10-CM

## 2024-01-15 RX ORDER — SOLIFENACIN SUCCINATE 5 MG/1
5 TABLET, FILM COATED ORAL DAILY
Qty: 90 TABLET | Refills: 1 | Status: SHIPPED | OUTPATIENT
Start: 2024-01-15 | End: 2024-03-21

## 2024-01-15 NOTE — TELEPHONE ENCOUNTER
Requested Prescriptions     Requested Prescriptions   Pending Prescriptions Disp Refills    solifenacin (VESICARE) 5 MG tablet 90 tablet 1     Sig: Take 1 tablet (5 mg) by mouth daily       There is no refill protocol information for this order          Last office visit: 7/21/2023 ; last virtual visit: Visit date not found with prescribing provider:  Sherrell Fields     Future Office Visit:

## 2024-01-16 ENCOUNTER — OFFICE VISIT (OUTPATIENT)
Dept: URGENT CARE | Facility: URGENT CARE | Age: 69
End: 2024-01-16
Payer: COMMERCIAL

## 2024-01-16 VITALS
DIASTOLIC BLOOD PRESSURE: 120 MMHG | BODY MASS INDEX: 51.83 KG/M2 | OXYGEN SATURATION: 97 % | WEIGHT: 315 LBS | HEART RATE: 94 BPM | SYSTOLIC BLOOD PRESSURE: 177 MMHG | TEMPERATURE: 96 F

## 2024-01-16 DIAGNOSIS — T14.8XXA WOUND INFECTION: ICD-10-CM

## 2024-01-16 DIAGNOSIS — L03.115 CELLULITIS OF RIGHT LOWER EXTREMITY: ICD-10-CM

## 2024-01-16 DIAGNOSIS — R26.2 DIFFICULTY WALKING: ICD-10-CM

## 2024-01-16 DIAGNOSIS — E11.42 DIABETIC POLYNEUROPATHY ASSOCIATED WITH TYPE 2 DIABETES MELLITUS (H): ICD-10-CM

## 2024-01-16 DIAGNOSIS — L08.9 WOUND INFECTION: ICD-10-CM

## 2024-01-16 DIAGNOSIS — I10 SEVERE HYPERTENSION: Primary | ICD-10-CM

## 2024-01-16 DIAGNOSIS — R60.9 PITTING EDEMA: ICD-10-CM

## 2024-01-16 DIAGNOSIS — R53.1 WEAKNESS: ICD-10-CM

## 2024-01-16 PROCEDURE — 99215 OFFICE O/P EST HI 40 MIN: CPT | Performed by: NURSE PRACTITIONER

## 2024-01-16 NOTE — PROGRESS NOTES
Assessment & Plan     Severe hypertension      Difficulty walking      Diabetic polyneuropathy associated with type 2 diabetes mellitus (H)      Cellulitis of right lower extremity      Wound infection      Pitting edema       Recommend further evaluation in emergency room for severe HTN with weakness, difficulty walking with infected wound, pitting edema as diuresis and stat labs indicated. Patient agreeable and declines ambulance and is discharged in stable condition.         Radha Mills NP  [facility] [place]    Karrie Woody is a 68 year old male who presents to clinic today for the following health issues:  Chief Complaint   Patient presents with    Ankle Pain     Left Ankle - Hx of Neuropathy and Having Hard Time Walking.     Wound Check     Right Leg Calf Area - Fell in November and Has Not Healed Yet      Patient presents for evaluation of neuropathy. He has been having a hard time walking due to symptoms which started yesterday. Associated symptoms: weakness in left ankle, swelling in bilateral lower extremities since yesterday. He had to crawl upstairs last night after sitting in easy chair. No injury. Has had to use cane today- did not contact PCP. Denies pain since ankle is numb. He wonders if there is some cream he can apply for the neuropathy.     Denies dizziness, fever, chest pain, shortness of breath. He also has a right lower leg wound which has been healing for months- since Nov.     He has a history of morbid obesity, HTN, left leg fracture, type 2 diabetes, arthritis in bilateral knees and was told he needs bilateral knee replacements. Last hemoglobin A1c 4/4/23 was 7.4. He took BP meds last night like usual. Does not check blood sugars.     Problem list, Medication list, Allergies, and Medical history reviewed in EPIC.    ROS:  Review of systems negative except for noted above        Objective    BP (!) 177/120   Pulse 94   Temp (!) 96  F (35.6  C)   Wt (!)  184.2 kg (406 lb)   SpO2 97%   BMI 51.83 kg/m    Physical Exam  Constitutional:       General: He is not in acute distress.     Appearance: He is obese. He is not toxic-appearing or diaphoretic.   Musculoskeletal:      Comments: Pitting edema bilateral lower extremities   Skin:     Comments: 5 cm open wound right anterior lower leg with surrounding erythema. Dusky left lower extremity   Neurological:      Mental Status: He is alert.      Sensory: Sensory deficit present.      Gait: Gait abnormal.      Comments: Walking slowly using cane

## 2024-01-16 NOTE — PATIENT INSTRUCTIONS
Go to emergency room for further evaluation of left ankle weakness, new onset difficulty walking, severe HTN, left ankle weakness

## 2024-01-17 ENCOUNTER — TELEPHONE (OUTPATIENT)
Dept: FAMILY MEDICINE | Facility: CLINIC | Age: 69
End: 2024-01-17
Payer: COMMERCIAL

## 2024-01-17 NOTE — TELEPHONE ENCOUNTER
Received call for [facility]  to schedule a hospital follow up for patient in the next 5 days.    Patient tried and could not schedule until the 30th of January    I scheduled him for Friday 1/19/24 with Dr. Hartman at 10:20.    I noticed an appointment was scheduled by someone else for tomorrow with another provider.  SW will ask patient which appointment he wants and call us back to cancel the unneeded appointment.    Christine M Klisch, RN

## 2024-01-18 ENCOUNTER — OFFICE VISIT (OUTPATIENT)
Dept: FAMILY MEDICINE | Facility: CLINIC | Age: 69
End: 2024-01-18
Payer: COMMERCIAL

## 2024-01-18 VITALS
BODY MASS INDEX: 40.43 KG/M2 | HEART RATE: 88 BPM | HEIGHT: 74 IN | SYSTOLIC BLOOD PRESSURE: 142 MMHG | WEIGHT: 315 LBS | TEMPERATURE: 97.8 F | RESPIRATION RATE: 24 BRPM | DIASTOLIC BLOOD PRESSURE: 74 MMHG | OXYGEN SATURATION: 98 %

## 2024-01-18 DIAGNOSIS — I89.0 ACQUIRED LYMPHEDEMA OF LOWER EXTREMITY: ICD-10-CM

## 2024-01-18 DIAGNOSIS — I42.0 DILATED CARDIOMYOPATHY (H): ICD-10-CM

## 2024-01-18 DIAGNOSIS — I48.91 ATRIAL FIBRILLATION, UNSPECIFIED TYPE (H): ICD-10-CM

## 2024-01-18 DIAGNOSIS — I10 ESSENTIAL HYPERTENSION: ICD-10-CM

## 2024-01-18 DIAGNOSIS — S81.801A NON-HEALING WOUND OF RIGHT LOWER EXTREMITY: ICD-10-CM

## 2024-01-18 DIAGNOSIS — M15.8 OTHER OSTEOARTHRITIS INVOLVING MULTIPLE JOINTS: ICD-10-CM

## 2024-01-18 DIAGNOSIS — G57.92 NEUROPATHY OF LEFT LOWER EXTREMITY: ICD-10-CM

## 2024-01-18 DIAGNOSIS — E11.00 TYPE 2 DIABETES MELLITUS WITH HYPEROSMOLARITY WITHOUT COMA, WITHOUT LONG-TERM CURRENT USE OF INSULIN (H): Primary | ICD-10-CM

## 2024-01-18 DIAGNOSIS — I87.2 CHRONIC STASIS DERMATITIS: ICD-10-CM

## 2024-01-18 DIAGNOSIS — M79.672 LEFT FOOT PAIN: ICD-10-CM

## 2024-01-18 DIAGNOSIS — E66.01 MORBID OBESITY (H): ICD-10-CM

## 2024-01-18 PROCEDURE — 99417 PROLNG OP E/M EACH 15 MIN: CPT | Performed by: PHYSICIAN ASSISTANT

## 2024-01-18 PROCEDURE — 99215 OFFICE O/P EST HI 40 MIN: CPT | Performed by: PHYSICIAN ASSISTANT

## 2024-01-18 RX ORDER — RESPIRATORY SYNCYTIAL VIRUS VACCINE 120MCG/0.5
0.5 KIT INTRAMUSCULAR ONCE
Qty: 1 EACH | Refills: 0 | Status: CANCELLED | OUTPATIENT
Start: 2024-01-18 | End: 2024-01-18

## 2024-01-18 RX ORDER — METOPROLOL SUCCINATE 25 MG/1
1 TABLET, EXTENDED RELEASE ORAL DAILY
COMMUNITY
Start: 2024-01-16

## 2024-01-18 RX ORDER — GABAPENTIN 300 MG/1
300 CAPSULE ORAL 3 TIMES DAILY
Qty: 90 CAPSULE | Refills: 0 | Status: SHIPPED | OUTPATIENT
Start: 2024-01-18 | End: 2024-02-06

## 2024-01-18 ASSESSMENT — PATIENT HEALTH QUESTIONNAIRE - PHQ9
10. IF YOU CHECKED OFF ANY PROBLEMS, HOW DIFFICULT HAVE THESE PROBLEMS MADE IT FOR YOU TO DO YOUR WORK, TAKE CARE OF THINGS AT HOME, OR GET ALONG WITH OTHER PEOPLE: SOMEWHAT DIFFICULT
SUM OF ALL RESPONSES TO PHQ QUESTIONS 1-9: 7
SUM OF ALL RESPONSES TO PHQ QUESTIONS 1-9: 7

## 2024-01-18 ASSESSMENT — PAIN SCALES - GENERAL: PAINLEVEL: NO PAIN (0)

## 2024-01-18 NOTE — PROGRESS NOTES
Assessment & Plan     Type 2 diabetes mellitus with hyperosmolarity without coma, without long-term current use of insulin (H)  Chronic, advised to continue with metformin and diabetic diet. Follow up with PCP to recheck on 1/30/24   - Adult Eye  Referral; Future      Atrial fibrillation, unspecified type (H)  New diagnosis from ER on 1/16/24. Was placed on blood thinner. Educated  patient to stop NSAID's  now that he is on medication.  Still in Atrial fibrillation today in clinic. Patient is asymptomatic. I recommend further evaluation for this new diagnosis with Cardiology. Referral placed. Follow up as needed   - Adult Cardiology Eval  Referral; Future    Neuropathy of left lower extremity  Worsening neuropathy of bilateral lower legs but in particular left leg. I am doing a U/S to rule out DVT due to sudden change in symptoms. However I think patient would benefit from gabapentin to help manage symptoms. Side effects discussed. Follow up to recheck medication with PCP on 1/30/24  - gabapentin (NEURONTIN) 300 MG capsule; Take 1 capsule (300 mg) by mouth 3 times daily  - Physical Therapy Referral; Future    Non-healing wound of right lower extremity  Discussed with patient today that based on the severity of this wound and his chronic medication conditions this makes it difficult to treat in primary care and he needs to see a specialist to help manage. In the interm I recommend keep the wound wet with Aquaphor or Vaseline and keeping it covered and wearing ACE wraps daily. Patient agree's with this plan and has no further questions  - Lymphedema Therapy Referral; Future    Dilated cardiomyopathy (H)  One time due to bad reaction to flu shot over 20 yrs ago. However, with no diagnosis of Atrial fibrillation and the leg swelling further evaluation should be done with Cardiology. Referral placed  - Adult Cardiology Eval  Referral; Future    Morbid obesity (H)    Acquired lymphedema of  "lower extremity  Needs further help with balance, strength which is why referrals were done. ACE wraps given for patient to start wearing as well.   - Physical Therapy Referral; Future  - Lymphedema Therapy Referral; Future  - ELASTIC COMPRESSION BANDAGE    Essential hypertension  Blood pressure is okay here in clinic. Is now on metoprolol in addition to Lisinopril. Advised to work with Cardiology on manage blood pressure now with atrial fibrillation  - Adult Cardiology Eval  Referral; Future    Left foot pain  Sudden new pain in left foot, thought by ER to be neuropathy but with new diagnosis in the ER of atrial fibrillation I recommend rule out a DVT. Patient will call to schedule.   - US Lower Extremity Venous Duplex Left; Future    Other osteoarthritis involving multiple joints  Advised patient to manage symptoms with tylenol NOT naproxen due to blood thinner. Patient agree's with this plan and has no further questions    Chronic stasis dermatitis  Noted on exam, Will work with specialist to help manage    [facility]  70 minutes spent by me on the date of the encounter doing chart review, history and exam, documentation and further activities per the note    MED REC REQUIRED  Post Medication Reconciliation Status: discharge medications reconciled, continue medications without change  BMI  Estimated body mass index is 50.95 kg/m  as calculated from the following:    Height as of this encounter: 1.885 m (6' 2.2\").    Weight as of this encounter: 181 kg (399 lb).       Karrie Woody is a 68 year old, presenting for the following health issues:  ER F/U          1/18/2024    10:55 AM   Patient Reported Additional Medications   Patient reports taking the following new medications Multiple new meds     HPI       ED/UC Followup:    Facility:  [facility]   Date of visit: 1/16/2024  Reason for visit: Hypertension and Leg pain " "  Current Status: Patient has not been checking BP since ER visit. He is taking his BP medication and tolerating Rx well. Neuropathy in bilateral feet, worse in the left; not necessarily pain but numbness.   Additional provider notes :   Right lower leg wound - has been there for 2 months. He fell and since then it has been there. It has not been healing well. Has been using antiseptic wash 2x a day and then using topical antibiotic ointment. NO fevers. No discharge from the wound. There is swelling but this is normal for him  Lower neuropathy - Has always had neuropathy since diabetes but suddenly symptoms worsened in his left foot. It started 4 days ago. It has been getting worse over the next couple of days. The left foot has been numb over this time. Hard to walk on it because he can't feel it. Decreased strength. He has swelling but not new. No redness. ER did not do a U/S   Heart - Has long hx of high blood pressure and one episode over 20 yrs ago from a flu show which caused cardiomyopathy but nothing since.  He was found to have Atrial fibrillation in the ER . Was placed no blood thinner and metoprolol.       Objective    BP (!) 142/74   Pulse 88   Temp 97.8  F (36.6  C) (Oral)   Resp 24   Ht 1.885 m (6' 2.2\")   Wt (!) 181 kg (399 lb)   SpO2 98%   BMI 50.95 kg/m    Body mass index is 50.95 kg/m .    Review of Systems  Constitutional, HEENT, cardiovascular, pulmonary, gi and gu systems are negative, except as otherwise noted.  Physical Exam   GENERAL: alert and no distress  NECK: no adenopathy, no asymmetry, masses, or scars  RESP: lungs clear to auscultation - no rales, rhonchi or wheezes  CV: irregularly irregular rhythm, no murmur, click or rub, peripheral pulses strong, and diminished pulses 2+ bilateral lower extremity pitting edema to bilateral lower legs    MS: 2+ pitting edema to bilateral lower legs and abnormal diminished pulse(s) bilateral  SKIN: nonhealing open wound 2 inch x 2 inch of " right lower extremities. With dark brown black in color, dry, low exudate, mild tender to palpation, no erythema surroundings, no discharge, significant statis dermatitis of the right lower leg        Signed Electronically by: CALVIN Mcintosh    Answers submitted by the patient for this visit:  Patient Health Questionnaire (Submitted on 1/18/2024)  If you checked off any problems, how difficult have these problems made it for you to do your work, take care of things at home, or get along with other people?: Somewhat difficult  PHQ9 TOTAL SCORE: 7

## 2024-01-18 NOTE — LETTER
January 18, 2024      Malcom Coates  [address] [address]        To Whom It May Concern:    Malcom Coates  was seen on 1/18/24.  Please excuse him  until 1/30/24 due to illness.        Sincerely,        CALVIN Mcintosh

## 2024-01-18 NOTE — PATIENT INSTRUCTIONS
Start Gabapentin for your nerve pain  Stop Naproxen  Follow up with Dr. Hartman on 1/30/24  Start wearing ACE wraps around your legs  Schedule Ultrasound or your leg

## 2024-01-19 ENCOUNTER — ANCILLARY PROCEDURE (OUTPATIENT)
Dept: ULTRASOUND IMAGING | Facility: CLINIC | Age: 69
End: 2024-01-19
Attending: PHYSICIAN ASSISTANT
Payer: COMMERCIAL

## 2024-01-19 DIAGNOSIS — M79.672 LEFT FOOT PAIN: ICD-10-CM

## 2024-01-19 PROCEDURE — 93971 EXTREMITY STUDY: CPT | Mod: LT | Performed by: RADIOLOGY

## 2024-01-22 ENCOUNTER — TELEPHONE (OUTPATIENT)
Dept: FAMILY MEDICINE | Facility: CLINIC | Age: 69
End: 2024-01-22
Payer: COMMERCIAL

## 2024-01-22 NOTE — TELEPHONE ENCOUNTER
Attempt #1 to call patient.     RN left voicemail and requested return call to [facility] at [phone].     Melinda Vilchis RN, BSN  [facility]: [place]

## 2024-01-22 NOTE — TELEPHONE ENCOUNTER
----- Message from CALVIN Mcintosh sent at 1/22/2024  7:25 AM CST -----  Please call patient and inform him that his U/S was normal indicating no blood clot.    Thank you,  Mary Farrell PA-C

## 2024-01-23 NOTE — TELEPHONE ENCOUNTER
RN called and spoke with patient.    RN reviewed providers message    Patient requested RN send My Chart message with referral contact info.      RN replied to patient via "[employer]t. See message for details.     Alexys Anna RN, BSN, PHN  [facility]: [place]

## 2024-01-27 ENCOUNTER — HEALTH MAINTENANCE LETTER (OUTPATIENT)
Age: 69
End: 2024-01-27

## 2024-01-29 ENCOUNTER — TELEPHONE (OUTPATIENT)
Dept: FAMILY MEDICINE | Facility: CLINIC | Age: 69
End: 2024-01-29
Payer: COMMERCIAL

## 2024-01-29 NOTE — TELEPHONE ENCOUNTER
Reason for Call:  Appointment Request    Patient requesting this type of appt:  Hospital/ED Follow-Up     Requested provider: Henrique Hartman    Reason patient unable to be scheduled: Not within requested timeframe    When does patient want to be seen/preferred time: 1-2 days    Comments: Pt had an appointment for 1/30/24 however patient is inpatient at [facility] for a hernia surgery that was on 1/25/24.  Pt states he needs a post ER follow up due to being put on blood thinners. Hospital request the day after discharge for a visit. Pt is hoping to discharge tomorrow.      Could we send this information to you in [employer] or would you prefer to receive a phone call?:   Patient would prefer a phone call   Okay to leave a detailed message?: Yes at Home number on file [phone] (home)    Call taken on 1/29/2024 at 9:51 AM by Tanja Mortensen

## 2024-01-29 NOTE — TELEPHONE ENCOUNTER
Called patient and left a detailed voicemail message that I was returning his call.  Asked patient to call back and ask to speak to a .    OSMEL Cole  [facility]

## 2024-01-29 NOTE — TELEPHONE ENCOUNTER
Pt returning call, Pt states that he in currently inpatient and requesting advise of when he should schedule hospital follow up. RN informed pt that as he is currently inpatient, he can call clinic once discharged to schedule hospital follow up as he is unsure when he will be discharged. Pt verbalized understanding and stated no further questions. Pt to call clinic for hospital follow up once discharged.     Odalys Scruggs RN on 1/29/2024 at 10:56 AM

## 2024-01-30 ENCOUNTER — TELEPHONE (OUTPATIENT)
Dept: FAMILY MEDICINE | Facility: CLINIC | Age: 69
End: 2024-01-30

## 2024-01-30 NOTE — TELEPHONE ENCOUNTER
Forms/Letter Request    Type of form/letter: Work    Have you been seen for this request: No    Do we have the form/letter: No    When is form/letter needed by: As soon as possible.     How would you like the form/letter returned:  [employer]    Patient Notified form requests are processed in 3-5 business days:Yes    Could we send this information to you in [employer] or would you prefer to receive a phone call?:   Patient would prefer a phone call   Okay to leave a detailed message?: Yes at Cell number on file:    Telephone Information:   Mobile [phone]

## 2024-01-31 ENCOUNTER — TELEPHONE (OUTPATIENT)
Dept: FAMILY MEDICINE | Facility: CLINIC | Age: 69
End: 2024-01-31
Payer: COMMERCIAL

## 2024-01-31 NOTE — TELEPHONE ENCOUNTER
Patient called in and he is scheduled to see Dr Hartman on 02/06/24.  He has decided to wait to get a letter for work until he sees Dr Hartman on 02/06/24.    OSMEL Cole  [facility]

## 2024-01-31 NOTE — TELEPHONE ENCOUNTER
Patient called to confirm ED follow up and to let the nurses know that he can wait until the appt date for a work letter. Pt checked in with HR from work and they stated they can receive the letter after the appt date but to make sure there are no gaps in the dates that he has not worked such as the rest of this week and a couple days next week. Pt had no other concerns. Stated he will be at the clinic at 845 am on the 6th.     Thanks,  Annalee RN  [facility]

## 2024-02-06 ENCOUNTER — OFFICE VISIT (OUTPATIENT)
Dept: FAMILY MEDICINE | Facility: CLINIC | Age: 69
End: 2024-02-06
Payer: COMMERCIAL

## 2024-02-06 VITALS
SYSTOLIC BLOOD PRESSURE: 133 MMHG | RESPIRATION RATE: 24 BRPM | BODY MASS INDEX: 40.43 KG/M2 | HEIGHT: 74 IN | WEIGHT: 315 LBS | OXYGEN SATURATION: 100 % | HEART RATE: 75 BPM | DIASTOLIC BLOOD PRESSURE: 86 MMHG | TEMPERATURE: 98.1 F

## 2024-02-06 DIAGNOSIS — I48.91 ATRIAL FIBRILLATION, UNSPECIFIED TYPE (H): ICD-10-CM

## 2024-02-06 DIAGNOSIS — M17.12 PRIMARY OSTEOARTHRITIS OF LEFT KNEE: ICD-10-CM

## 2024-02-06 DIAGNOSIS — K43.6 VENTRAL HERNIA WITH OBSTRUCTION AND WITHOUT GANGRENE: ICD-10-CM

## 2024-02-06 DIAGNOSIS — R29.898 WEAKNESS OF BOTH LOWER EXTREMITIES: ICD-10-CM

## 2024-02-06 DIAGNOSIS — E11.00 TYPE 2 DIABETES MELLITUS WITH HYPEROSMOLARITY WITHOUT COMA, WITHOUT LONG-TERM CURRENT USE OF INSULIN (H): ICD-10-CM

## 2024-02-06 DIAGNOSIS — E66.01 MORBID OBESITY (H): ICD-10-CM

## 2024-02-06 DIAGNOSIS — Z09 HOSPITAL DISCHARGE FOLLOW-UP: Primary | ICD-10-CM

## 2024-02-06 DIAGNOSIS — M17.11 PRIMARY OSTEOARTHRITIS OF RIGHT KNEE: ICD-10-CM

## 2024-02-06 DIAGNOSIS — I87.2 CHRONIC VENOUS STASIS DERMATITIS OF BOTH LOWER EXTREMITIES: ICD-10-CM

## 2024-02-06 DIAGNOSIS — G57.92 NEUROPATHY OF LEFT LOWER EXTREMITY: ICD-10-CM

## 2024-02-06 PROCEDURE — 99207 PR FOOT EXAM NO CHARGE: CPT | Performed by: FAMILY MEDICINE

## 2024-02-06 PROCEDURE — 20610 DRAIN/INJ JOINT/BURSA W/O US: CPT | Mod: 50 | Performed by: FAMILY MEDICINE

## 2024-02-06 PROCEDURE — 99214 OFFICE O/P EST MOD 30 MIN: CPT | Mod: 25 | Performed by: FAMILY MEDICINE

## 2024-02-06 RX ORDER — TRIAMCINOLONE ACETONIDE 40 MG/ML
40 INJECTION, SUSPENSION INTRA-ARTICULAR; INTRAMUSCULAR ONCE
Status: COMPLETED | OUTPATIENT
Start: 2024-02-06 | End: 2024-02-06

## 2024-02-06 RX ORDER — GABAPENTIN 300 MG/1
CAPSULE ORAL
Qty: 90 CAPSULE | Refills: 0 | Status: SHIPPED | OUTPATIENT
Start: 2024-02-06 | End: 2024-07-12

## 2024-02-06 RX ORDER — SENNOSIDES A AND B 8.6 MG/1
8.6 TABLET, FILM COATED ORAL 2 TIMES DAILY PRN
COMMUNITY
Start: 2024-01-30

## 2024-02-06 RX ORDER — RESPIRATORY SYNCYTIAL VIRUS VACCINE 120MCG/0.5
0.5 KIT INTRAMUSCULAR ONCE
Qty: 1 EACH | Refills: 0 | Status: CANCELLED | OUTPATIENT
Start: 2024-02-06 | End: 2024-02-06

## 2024-02-06 RX ORDER — POLYETHYLENE GLYCOL 3350 17 G/17G
17 POWDER, FOR SOLUTION ORAL 2 TIMES DAILY PRN
COMMUNITY
Start: 2024-01-30

## 2024-02-06 RX ORDER — FUROSEMIDE 20 MG
TABLET ORAL
Qty: 30 TABLET | Refills: 0 | Status: SHIPPED | OUTPATIENT
Start: 2024-02-06

## 2024-02-06 RX ADMIN — TRIAMCINOLONE ACETONIDE 40 MG: 40 INJECTION, SUSPENSION INTRA-ARTICULAR; INTRAMUSCULAR at 10:28

## 2024-02-06 RX ADMIN — TRIAMCINOLONE ACETONIDE 40 MG: 40 INJECTION, SUSPENSION INTRA-ARTICULAR; INTRAMUSCULAR at 10:27

## 2024-02-06 ASSESSMENT — PAIN SCALES - GENERAL: PAINLEVEL: MODERATE PAIN (5)

## 2024-02-06 NOTE — PROGRESS NOTES
Assessment & Plan     Hospital discharge follow-up  Ventral hernia with obstruction and without gangrene  Patient was admitted to the hospital on January 24, 2024 for abdominal pain, bowel obstruction, ventral hernia.  Status post hernia repair he was discharged January 30,024.  Since then he has been seen and followed by a general surgeon.  He does have a follow-up appointment with the surgeon  He has no abdominal pain, no fever, no chills.  He has been having a bowel movement daily.  Wound healing well.    Reviewed his discharge summary, abdominal CT scan, labs and workup.    Atrial fibrillation, unspecified type (H)  Continue with current medications, Xarelto and metoprolol, no other symptoms  Follow up with cardiology on 02/12/2024    Reviewed EKG from 01/16/2024, Atrial fibrillations.    Type 2 diabetes mellitus with hyperosmolarity without coma, without long-term current use of insulin (H)  Last A1c at 6.3  Discussed diet and weight loss  Continue with current medicine.  - FOOT EXAM    Morbid obesity (H)  Discussed diet, exercise, increase physical activity.  Diet modification, avoid late meals.    Chronic venous stasis dermatitis of both lower extremities  Lower ext Doppler from 01/19/2024, no acute DVT.  Patient does have chronic venous dermatitis, with stasis.  Advised with supportive care, elevation, compression stocking.  In addition, given furosemide take 1 tablet daily for 3 to 5 days, as needed.    Patient denies short of breath, weight remains stable.    Follow-up with cardiology likely will need to have a 2D echo  - Compression Sleeve/Stocking Order for DME - ONLY FOR DME  - furosemide (LASIX) 20 MG tablet; One tab daily for 3- 5 days, for lower ext edema, then daily as needed.    Weakness of both lower extremities    - Physical Therapy Referral; Future    Primary osteoarthritis of right knee  Primary osteoarthritis of left knee  - diclofenac (VOLTAREN) 1 % topical gel; Apply 2 g topically as  "needed  - triamcinolone (KENALOG-40) injection 40 mg  - DRAIN/INJECT LARGE JOINT/BURSA     After discussion of various treatment options, and after reviewing risks and benefits and potential complications. And after signing a consent form, we elected to proceed with a cortisone injection for both knees.  The lateral aspect of both knees were cleaned , Betadine and Alcohol, then injected with 40 mg of Kenalog suspension along with 3 cc of 2% Lidocaine.  Patient tolerated injection well, givne post injection instructions.     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  Decreased Gabapentin to one tab qhs  - gabapentin (NEURONTIN) 300 MG capsule; Changed to one tab at bedtime ( decreased dose )    Discussed with patient gabapentin could be also contributing to his lower extremity edema.    MED REC REQUIRED  Post Medication Reconciliation Status: discharge medications reconciled and changed, per note/orders  BMI  Estimated body mass index is 50.97 kg/m  as calculated from the following:    Height as of this encounter: 1.88 m (6' 2\").    Weight as of this encounter: 180.1 kg (397 lb).   Weight management plan: Discussed healthy diet and exercise guidelines      Work on weight loss  Regular exercise    Karrie Woody is a 68 year old, presenting for the following health issues:  Hospital F/U        2/6/2024     8:48 AM   Additional Questions   Roomed by Colleen LUI CMA   Accompanied by Daughter         2/6/2024     8:48 AM   Patient Reported Additional Medications   Patient reports taking the following new medications None     Hospital Follow-up Visit:    Hospital/Nursing Home/IP Rehab Facility:  [facility]  Date of Admission: 01/24/24  Date of Discharge: 01/30/24  Reason(s) for Admission: Abdominal Hernia    Was your " "hospitalization related to COVID-19? No   Problems taking medications regularly:  None  Medication changes since discharge: Medications were added to chart  Problems adhering to non-medication therapy:  None    Summary of hospitalization:  CareEverywhere information obtained and reviewed  Diagnostic Tests/Treatments reviewed.  Follow up needed: follow up with Cardiology, Urology and general surgery  Other Healthcare Providers Involved in Patient s Care:         Physical Therapy  Update since discharge: improved.         Plan of care communicated with patient and family           Review of Systems  Constitutional, HEENT, cardiovascular, pulmonary, GI, , musculoskeletal, neuro, skin, endocrine and psych systems are negative, except as otherwise noted.      Objective    /86 (BP Location: Right arm, Patient Position: Chair, Cuff Size: Adult Large)   Pulse 75   Temp 98.1  F (36.7  C) (Oral)   Resp 24   Ht 1.88 m (6' 2\")   Wt (!) 180.1 kg (397 lb)   SpO2 100%   BMI 50.97 kg/m    Body mass index is 50.97 kg/m .  Physical Exam   GENERAL: alert and no distress  NECK: no adenopathy, no asymmetry, masses, or scars  RESP: lungs clear to auscultation - no rales, rhonchi or wheezes  CV: irregularly irregular rhythm, normal S1 S2, no S3 or S4, no murmur, click or rub, peripheral pulses strong,   Superficial skin ulcer, lateral right leg    ABDOMEN: bowel sounds normal and surgical wound healing well  Staples in place.  MS: +2 edema to bilateral  Right lower leg with superficial ulcer, no bleeding, no sign of infections.  BACK: no CVA tenderness, no paralumbar tenderness  Diabetic foot exam: normal DP and PT pulses, no trophic changes or ulcerative lesions, normal sensory exam, and normal monofilament exam    Orders Placed This Encounter   Procedures    DRAIN/INJECT LARGE JOINT/BURSA    DEPRESSION ACTION PLAN (DAP)    FOOT EXAM    Physical Therapy Referral    Compression Sleeve/Stocking Order for DME - ONLY FOR DME "            Signed Electronically by: Henrique Hartman MD

## 2024-02-06 NOTE — LETTER
February 6, 2024      Malcom Coates  [address] [address]        To Whom It May Concern:    Malcom Coates  was seen on 02/6/2024.  Please excuse him from 01/30/2024 until 02/16/2024 due to illness.        Sincerely,        Henrique Hartman MD

## 2024-02-06 NOTE — LETTER
February 6, 2024      Malcom Coates  [address] [address]        To Whom It May Concern:    Malcom Coates  was seen on 02/6/2024.  Please excuse him  until 02/16/2024  due to illness.        Sincerely,        Henrique Hartman MD

## 2024-02-12 ENCOUNTER — OFFICE VISIT (OUTPATIENT)
Dept: CARDIOLOGY | Facility: CLINIC | Age: 69
End: 2024-02-12
Attending: PHYSICIAN ASSISTANT
Payer: COMMERCIAL

## 2024-02-12 VITALS
HEART RATE: 81 BPM | BODY MASS INDEX: 51.36 KG/M2 | WEIGHT: 315 LBS | OXYGEN SATURATION: 98 % | DIASTOLIC BLOOD PRESSURE: 82 MMHG | SYSTOLIC BLOOD PRESSURE: 132 MMHG

## 2024-02-12 DIAGNOSIS — I48.91 ATRIAL FIBRILLATION, UNSPECIFIED TYPE (H): ICD-10-CM

## 2024-02-12 DIAGNOSIS — I48.19 PERSISTENT ATRIAL FIBRILLATION (H): Primary | ICD-10-CM

## 2024-02-12 DIAGNOSIS — I10 ESSENTIAL HYPERTENSION: ICD-10-CM

## 2024-02-12 DIAGNOSIS — I42.0 DILATED CARDIOMYOPATHY (H): ICD-10-CM

## 2024-02-12 PROCEDURE — 99204 OFFICE O/P NEW MOD 45 MIN: CPT | Performed by: INTERNAL MEDICINE

## 2024-02-12 PROCEDURE — 93000 ELECTROCARDIOGRAM COMPLETE: CPT | Performed by: INTERNAL MEDICINE

## 2024-02-12 NOTE — LETTER
2/12/2024      RE: Malcom Coates  [address]  [place] [address]       Dear Colleague,    Thank you for the opportunity to participate in the care of your patient, Malcom Coates, at the [facility]ART [facility]. Please see a copy of my visit note below.    HPI: Purpose of visit: Patient presents for evaluation of new onset atrial fibrillation    Patient is a 68-year-old gentleman with past medical history that includes viral myocarditis about 20 years ago, elevated BMI, type 2 diabetes, and recent hernia surgery.    On January 16, 2024, patient presented to the emergency department for evaluation of pain in the left foot.  He was found to have atrial fibrillation that was asymptomatic.  Patient did not report any symptoms of irregular heartbeat sensation, potation's, exertional dyspnea, exertional angina, frequent lightheadedness, presyncope or syncope.  His heart rate was controlled at 85 bpm.    Patient is currently taking Toprol-XL 25 mg once daily and Xarelto 20 mg once daily.      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 30 tablet 0     gabapentin (NEURONTIN) 300 MG capsule Changed to one tab at  bedtime ( decreased dose ) 90 capsule 0     lisinopril (ZESTRIL) 20 MG tablet Take 1 tablet (20 mg) by mouth daily 90 tablet 3     metFORMIN (GLUCOPHAGE) 500 MG tablet Take 1 tablet (500 mg) by mouth 2 times daily (with meals) 180 tablet 3     metoprolol succinate ER (TOPROL XL) 25 MG 24 hr tablet Take 1 tablet by mouth daily       polyethylene glycol (MIRALAX) 17 GM/Dose powder Take 17 g by mouth 2 times daily as needed       rivaroxaban ANTICOAGULANT (XARELTO) 20 MG TABS tablet Take 20 mg by mouth       senna (SENOKOT) 8.6 MG tablet Take 8.6 mg by mouth 2 times daily as needed for constipation       solifenacin (VESICARE) 5 MG tablet Take 1 tablet (5 mg) by mouth daily 90 tablet 1       PAST SURGICAL HISTORY:  Past Surgical History:   Procedure Laterality Date     HC CLOSED TX SHOULDER DISLOC W MANIP NO ANESTH      LT.     LAPAROSCOPY, SURGICAL; REPAIR INCISIONAL OR VENTRAL HERNIA  01/25/2024     ZZC OPEN TX TIBIAL FRACTURE PROXIMAL UNICONDYLAR  01/01/1976    left        ALLERGIES:   No Known Allergies    FAMILY HISTORY:  - Premature coronary artery disease  - Atrial fibrillation  - Sudden cardiac death     SOCIAL HISTORY:  Social History     Tobacco Use     Smoking status: Never     Passive exposure: Never     Smokeless tobacco: Never   Vaping Use     Vaping Use: Never used   Substance Use Topics     Alcohol use: Yes     Comment: occassionally     Drug use: Never       ROS:   Constitutional: No fever, chills, or sweats. Weight stable.   ENT: No visual disturbance, ear ache, epistaxis, sore throat.   Cardiovascular: As per HPI.   Respiratory: No cough, hemoptysis.    GI: No nausea, vomiting, hematemesis, melena, or hematochezia.   : No hematuria.   Integument: Negative.   Psychiatric: Negative.   Hematologic:  Easy bruising, no easy bleeding.  Neuro: Negative.   Endocrinology: No significant heat or cold intolerance   Musculoskeletal: No myalgia.    Exam:  BP (!) 156/84 (BP Location: Right arm, Patient Position:  Chair, Cuff Size: Adult Large)   Pulse 84   Wt (!) 181.4 kg (400 lb)   SpO2 98%   BMI 51.36 kg/m    GENERAL APPEARANCE: healthy, alert and no distress  HEENT: no icterus, no xanthelasmas, normal pupil size and reaction, normal palate, mucosa moist, no central cyanosis  NECK: no adenopathy, no asymmetry, masses, or scars, thyroid normal to palpation and no bruits, JVP not elevated  RESPIRATORY: lungs clear to auscultation - no rales, rhonchi or wheezes, no use of accessory muscles, no retractions, respirations are unlabored, normal respiratory rate  CARDIOVASCULAR: irregular rhythm.  ABDOMEN: soft, non tender, without hepatosplenomegaly, no masses palpable, bowel sounds normal, aorta not enlarged by palpation, no abdominal bruits  EXTREMITIES: peripheral pulses normal, no edema, no bruits  NEURO: alert and oriented to person/place/time, normal speech, gait and affect  VASC: Radial, femoral, dorsalis pedis and posterior tibialis pulses are normal in volumes and symmetric bilaterally. No bruits are heard.  SKIN: no ecchymoses, no rashes    Labs:  CBC RESULTS:   Lab Results   Component Value Date    WBC 6.8 04/04/2023    RBC 5.41 04/04/2023    HGB 15.9 04/04/2023    HCT 49.1 04/04/2023    MCV 91 04/04/2023    MCH 29.4 04/04/2023    MCHC 32.4 04/04/2023    RDW 14.1 04/04/2023     04/04/2023       BMP RESULTS:  Lab Results   Component Value Date     04/04/2023     12/19/2019    POTASSIUM 5.0 04/04/2023    POTASSIUM 4.7 12/19/2019    CHLORIDE 100 04/04/2023    CHLORIDE 106 12/19/2019    CO2 27 04/04/2023    CO2 30 12/19/2019    ANIONGAP 13 04/04/2023    ANIONGAP 4 12/19/2019     (H) 04/04/2023     (H) 12/19/2019    BUN 20.5 04/04/2023    BUN 25 12/19/2019    CR 1.13 04/04/2023    CR 0.94 12/19/2019    GFRESTIMATED 71 04/04/2023    GFRESTIMATED 85 12/19/2019    GFRESTBLACK >90 12/19/2019    ALBINO 10.3 (H) 04/04/2023    ALBINO 9.2 12/19/2019        INR RESULTS:  No results found for:  "\"INR\"    Procedures:      Assessment and Plan:     New onset atrial fibrillation-persistent and asymptomatic    Patient was incidentally diagnosed to have atrial fibrillation and is completely asymptomatic.  Based on an electrocardiogram today his rate is well-controlled at 88 bpm.  Transthoracic echocardiogram done at [facility] in year 2000 and 2001 showed ejection fraction of 60%.  I recommended repeating the transthoracic echocardiogram to evaluate the structure and function of the heart.    I discussed the aims, risks, and alternatives to rhythm control versus rate control strategy.  Given that patient is completely asymptomatic from his atrial fibrillation, it is not unreasonable to pursue a rate control strategy.  Patient will continue Toprol-XL for rate control and rivaroxaban for stroke prophylaxis. We will see patient by video visit to discuss the results of the echocardiogram.            CC  Patient Care Team:  Henrique Hartman MD as PCP - General (Family Medicine)  Henrique Hartman MD as Assigned PCP  Sherrell Fields PA-C as Physician Assistant (Urology)  Sherrell Fields PA-C as Assigned Surgical Provider  Lenora Wheeler RD as Diabetes Educator (Dietitian)  Sandy Medina MD as MD (Cardiovascular Disease)  CINTHYA PRESCOTT        Please do not hesitate to contact me if you have any questions/concerns.     Sincerely,     Sandy Medina MD  "

## 2024-02-12 NOTE — NURSING NOTE
"Chief Complaint   Patient presents with    Atrial Fib     New general cardiology for Atrial fibrillation       Initial BP (!) 156/84 (BP Location: Right arm, Patient Position: Chair, Cuff Size: Adult Large)   Pulse 84   Wt (!) 181.4 kg (400 lb)   SpO2 98%   BMI 51.36 kg/m   Estimated body mass index is 51.36 kg/m  as calculated from the following:    Height as of 2/6/24: 1.88 m (6' 2\").    Weight as of this encounter: 181.4 kg (400 lb)..  BP completed using cuff size: ROSENDA Llamas    "

## 2024-02-12 NOTE — PATIENT INSTRUCTIONS
Thank you for coming to the [facility] @ [facility] [place]; please note the following instructions:    1. EKG today    2. Echo    Pre-procedure instructions - Echocardiogram  Patient Education    How do I prepare for my exam?    You may eat, drink and take your normal medicines.  Please do not apply perfumes or lotions on the day of your exam.    What Should I wear?  Please bring or wear a comfortable two-piece outfit.     How long does the Exam Take? Most tests take up to 60 minutes.        Do I need a ? No      What is this test An echocardiogram uses sound waves to produce images of your heart. This common test allows your doctor to see your heart beating and pumping blood. Your doctor can use the images from an echocardiogram to identify heart disease.     3. Plan for a video visit with Dr. Medina after results to discuss        If you have any questions regarding your visit please contact your care team:     Cardiology  Telephone Number   Sandy DOYLE., RN  Isi MCKEON, RN  Salima GILLIAM, RN  Xiomara ALVAREZ, RMA  Alfreda DREW, RMA  Monica VARGAS, Visit Facilitator  Eugenie VELIZ [facility] [phone] (option 1)   For scheduling appts:     [phone] (select option 1)       For the Device Clinic (Pacemakers and ICD's)  RN's :  Tona Herrera   During business hours: [phone]    *After business hours:  [phone] (select option 4)      Normal test result notifications will be released via [employer] or mailed within 7 business days.  All other test results, will be communicated via telephone once reviewed by your cardiologist.    If you need a medication refill please contact your pharmacy.  Please allow 3 business days for your refill to be completed.    As always, thank you for trusting us with your health care needs!

## 2024-02-12 NOTE — PROGRESS NOTES
HPI: Purpose of visit: Patient presents for evaluation of new onset atrial fibrillation    Patient is a 68-year-old gentleman with past medical history that includes viral myocarditis about 20 years ago, elevated BMI, type 2 diabetes, and recent hernia surgery.    On January 16, 2024, patient presented to the emergency department for evaluation of pain in the left foot.  He was found to have atrial fibrillation that was asymptomatic.  Patient did not report any symptoms of irregular heartbeat sensation, potation's, exertional dyspnea, exertional angina, frequent lightheadedness, presyncope or syncope.  His heart rate was controlled at 85 bpm.    Patient is currently taking Toprol-XL 25 mg once daily and Xarelto 20 mg once daily.      PAST MEDICAL HISTORY:  Past Medical History:   Diagnosis Date    Cardiomyopathy (H) 2001    viral     DJD (degenerative joint disease) of knee     HTN (hypertension)     Hypertension goal BP (blood pressure) < 140/90 12/1/2011    Leg fracture, left     Moderate major depression (H) 12/1/2011    Obesity 12/1/2011    Osteoarthritis (arthritis due to wear and tear of joints) 12/1/2011       CURRENT MEDICATIONS:  Current Outpatient Medications   Medication Sig Dispense Refill    atorvastatin (LIPITOR) 20 MG tablet Take 1 tablet (20 mg) by mouth At Bedtime 90 tablet 3    diclofenac (VOLTAREN) 1 % topical gel Apply 2 g topically as needed      furosemide (LASIX) 20 MG tablet One tab daily for 3- 5 days, for lower ext edema, then daily as needed. 30 tablet 0    gabapentin (NEURONTIN) 300 MG capsule Changed to one tab at bedtime ( decreased dose ) 90 capsule 0    lisinopril (ZESTRIL) 20 MG tablet Take 1 tablet (20 mg) by mouth daily 90 tablet 3    metFORMIN (GLUCOPHAGE) 500 MG tablet Take 1 tablet (500 mg) by mouth 2 times daily (with meals) 180 tablet 3    metoprolol succinate ER (TOPROL XL) 25 MG 24 hr tablet Take 1 tablet by mouth daily      polyethylene glycol (MIRALAX) 17 GM/Dose powder  Take 17 g by mouth 2 times daily as needed      rivaroxaban ANTICOAGULANT (XARELTO) 20 MG TABS tablet Take 20 mg by mouth      senna (SENOKOT) 8.6 MG tablet Take 8.6 mg by mouth 2 times daily as needed for constipation      solifenacin (VESICARE) 5 MG tablet Take 1 tablet (5 mg) by mouth daily 90 tablet 1       PAST SURGICAL HISTORY:  Past Surgical History:   Procedure Laterality Date    HC CLOSED TX SHOULDER DISLOC W MANIP NO ANESTH      LT.    LAPAROSCOPY, SURGICAL; REPAIR INCISIONAL OR VENTRAL HERNIA  01/25/2024    ZZC OPEN TX TIBIAL FRACTURE PROXIMAL UNICONDYLAR  01/01/1976    left        ALLERGIES:   No Known Allergies    FAMILY HISTORY:  - Premature coronary artery disease  - Atrial fibrillation  - Sudden cardiac death     SOCIAL HISTORY:  Social History     Tobacco Use    Smoking status: Never     Passive exposure: Never    Smokeless tobacco: Never   Vaping Use    Vaping Use: Never used   Substance Use Topics    Alcohol use: Yes     Comment: occassionally    Drug use: Never       ROS:   Constitutional: No fever, chills, or sweats. Weight stable.   ENT: No visual disturbance, ear ache, epistaxis, sore throat.   Cardiovascular: As per HPI.   Respiratory: No cough, hemoptysis.    GI: No nausea, vomiting, hematemesis, melena, or hematochezia.   : No hematuria.   Integument: Negative.   Psychiatric: Negative.   Hematologic:  Easy bruising, no easy bleeding.  Neuro: Negative.   Endocrinology: No significant heat or cold intolerance   Musculoskeletal: No myalgia.    Exam:  BP (!) 156/84 (BP Location: Right arm, Patient Position: Chair, Cuff Size: Adult Large)   Pulse 84   Wt (!) 181.4 kg (400 lb)   SpO2 98%   BMI 51.36 kg/m    GENERAL APPEARANCE: healthy, alert and no distress  HEENT: no icterus, no xanthelasmas, normal pupil size and reaction, normal palate, mucosa moist, no central cyanosis  NECK: no adenopathy, no asymmetry, masses, or scars, thyroid normal to palpation and no bruits, JVP not  "elevated  RESPIRATORY: lungs clear to auscultation - no rales, rhonchi or wheezes, no use of accessory muscles, no retractions, respirations are unlabored, normal respiratory rate  CARDIOVASCULAR: irregular rhythm.  ABDOMEN: soft, non tender, without hepatosplenomegaly, no masses palpable, bowel sounds normal, aorta not enlarged by palpation, no abdominal bruits  EXTREMITIES: peripheral pulses normal, no edema, no bruits  NEURO: alert and oriented to person/place/time, normal speech, gait and affect  VASC: Radial, femoral, dorsalis pedis and posterior tibialis pulses are normal in volumes and symmetric bilaterally. No bruits are heard.  SKIN: no ecchymoses, no rashes    Labs:  CBC RESULTS:   Lab Results   Component Value Date    WBC 6.8 04/04/2023    RBC 5.41 04/04/2023    HGB 15.9 04/04/2023    HCT 49.1 04/04/2023    MCV 91 04/04/2023    MCH 29.4 04/04/2023    MCHC 32.4 04/04/2023    RDW 14.1 04/04/2023     04/04/2023       BMP RESULTS:  Lab Results   Component Value Date     04/04/2023     12/19/2019    POTASSIUM 5.0 04/04/2023    POTASSIUM 4.7 12/19/2019    CHLORIDE 100 04/04/2023    CHLORIDE 106 12/19/2019    CO2 27 04/04/2023    CO2 30 12/19/2019    ANIONGAP 13 04/04/2023    ANIONGAP 4 12/19/2019     (H) 04/04/2023     (H) 12/19/2019    BUN 20.5 04/04/2023    BUN 25 12/19/2019    CR 1.13 04/04/2023    CR 0.94 12/19/2019    GFRESTIMATED 71 04/04/2023    GFRESTIMATED 85 12/19/2019    GFRESTBLACK >90 12/19/2019    ALBINO 10.3 (H) 04/04/2023    ALBINO 9.2 12/19/2019        INR RESULTS:  No results found for: \"INR\"    Procedures:      Assessment and Plan:     New onset atrial fibrillation-persistent and asymptomatic    Patient was incidentally diagnosed to have atrial fibrillation and is completely asymptomatic.  Based on an electrocardiogram today his rate is well-controlled at 88 bpm.  Transthoracic echocardiogram done at [facility] in year 2000 and 2001 showed ejection fraction of " 60%.  I recommended repeating the transthoracic echocardiogram to evaluate the structure and function of the heart.    I discussed the aims, risks, and alternatives to rhythm control versus rate control strategy.  Given that patient is completely asymptomatic from his atrial fibrillation, it is not unreasonable to pursue a rate control strategy.  Patient will continue Toprol-XL for rate control and rivaroxaban for stroke prophylaxis. We will see patient by video visit to discuss the results of the echocardiogram.            CC  Patient Care Team:  Henrique Hartman MD as PCP - General (Family Medicine)  Henrique Hartman MD as Assigned PCP  Sherrell Fields PA-C as Physician Assistant (Urology)  Sherrell Fields PA-C as Assigned Surgical Provider  Lenora Wheeler RD as Diabetes Educator (Dietitian)  Sandy Medina MD as MD (Cardiovascular Disease)  CINTHYA PRESCOTT

## 2024-02-13 LAB
ATRIAL RATE - MUSE: 312 BPM
DIASTOLIC BLOOD PRESSURE - MUSE: NORMAL MMHG
INTERPRETATION ECG - MUSE: NORMAL
P AXIS - MUSE: NORMAL DEGREES
PR INTERVAL - MUSE: NORMAL MS
QRS DURATION - MUSE: 94 MS
QT - MUSE: 354 MS
QTC - MUSE: 428 MS
R AXIS - MUSE: 8 DEGREES
SYSTOLIC BLOOD PRESSURE - MUSE: NORMAL MMHG
T AXIS - MUSE: 7 DEGREES
VENTRICULAR RATE- MUSE: 88 BPM

## 2024-02-16 ENCOUNTER — THERAPY VISIT (OUTPATIENT)
Dept: PHYSICAL THERAPY | Facility: REHABILITATION | Age: 69
End: 2024-02-16
Attending: FAMILY MEDICINE
Payer: COMMERCIAL

## 2024-02-16 ENCOUNTER — TELEPHONE (OUTPATIENT)
Dept: FAMILY MEDICINE | Facility: CLINIC | Age: 69
End: 2024-02-16

## 2024-02-16 DIAGNOSIS — I89.0 ACQUIRED LYMPHEDEMA OF LOWER EXTREMITY: ICD-10-CM

## 2024-02-16 DIAGNOSIS — R29.898 WEAKNESS OF BOTH LOWER EXTREMITIES: ICD-10-CM

## 2024-02-16 DIAGNOSIS — S81.801A NON-HEALING WOUND OF RIGHT LOWER EXTREMITY: ICD-10-CM

## 2024-02-16 PROCEDURE — 97162 PT EVAL MOD COMPLEX 30 MIN: CPT | Mod: GP

## 2024-02-16 PROCEDURE — 97110 THERAPEUTIC EXERCISES: CPT | Mod: GP

## 2024-02-16 NOTE — LETTER
February 19, 2024      Malcom Coates  [address] [address]        To Whom It May Concern:       Malcom Coates  was seen on 02/6/2024.  Please excuse him from 01/30/2024 until 03/01/2024 due to illness.      Sincerely,        Henrique Hartman MD

## 2024-02-16 NOTE — TELEPHONE ENCOUNTER
Pt stopped into clinic to ask Dr Hartman to write a new letter for work. He has to do more PT, and is being told he should not be available to go back to work until March 1,2024. Pt will  the letter, call when it is ready. Thank you

## 2024-02-16 NOTE — PROGRESS NOTES
PHYSICAL THERAPY EVALUATION  Type of Visit: Evaluation    See electronic medical record for Abuse and Falls Screening details.    Subjective Pt reports that he used to work as a  full-time. Pt reports that he came home on January 15 and sat for a few hours and then had difficulty standing. Pt reports that his feet were swollen had difficulty walking. Pt reports went to the doctor and his swelling was not found to be due to blood clots. Pt reports tried to use a cane for a few days and then started to experience abdominal pain. Pt reports had abdominal surgery for a hernia and then had more difficulty walking. Pt reports has been using a bariatric walker since. Pt reports that he is dependent on the bariatric FWW. Pt reports that he cannot return to his job as  until he doesn't need the walker any more. Pt reports would like to decrease the swelling in his feet. Pt reports arthritis in both knees. Pt reports planning on using compression socks for the swelling in his feet. Pt reports pain when standing for a short period of time. Pt reports difficulty negotiating stairs due to decreased strength. Pt reports that now he has to crawl up and down the stairs. Pt reports fall when he was leaning against the outside trash can when entering his house. Pt reports had injections for his knees which improved his pain, but he still lacks strength in his legs      Presenting condition or subjective complaint: Edema of feet and ankles with inability to walk  Date of onset: 02/06/24    Relevant medical history: Arthritis; Bladder or bowel problems; Diabetes; Heart problems; High blood pressure; History of fractures; Incontinence; Non-healing wounds; Osteoarthritis; Overweight   Dates & types of surgery: 1/24/24 obstructed bowel and 1976 leg and shoulder fractures    Prior diagnostic imaging/testing results: Other Ultrasound for blood clot - negative result   Prior therapy history for the same diagnosis, illness  or injury: No      Prior Level of Function  Transfers: Independent, Assistive equipment, bariatric FWW  Ambulation: Independent, Assistive equipment, bariatric FWW    Living Environment  Social support: With family members   Type of home: House; 2-story; Basement   Stairs to enter the home: Yes 1 Is there a railing: No   Ramp: No   Stairs inside the home: Yes 26 Is there a railing: Yes   Help at home: None  Equipment owned: Walker with wheels     Employment: Yes   Hobbies/Interests: None    Patient goals for therapy: Walk without assistance of walker and release to rtw    Pain assessment: Pain present     Objective      OBSERVATION: Antalgic gait with bariatric FWW, BLE bow-legged, swelling in BLE  INTEGUMENTARY:   POSTURE: Sitting Posture: Rounded shoulders  PALPATION:   RANGE OF MOTION: LE ROM WFL  UE ROM WFL  STRENGTH: LE Strength WFL  UE Strength WFL    BED MOBILITY:     TRANSFERS: WFL, Independent, bariatric FWW, pt requires BUE for transfers    WHEELCHAIR MOBILITY:     GAIT:   Level of [place]: WFL, Independent  Assistive Device(s): Walker (front wheeled), bariatric FWW  Gait Deviations: Antalgic  Slow gait  Gait Distance:   Stairs:     BALANCE: Sitting Balance (static):Good  Sit to Stand Balance:Fair    SPECIAL TESTS  Functional Gait Assessment (FGA)      10 Meter Walk Test (Comfortable)     10 Meter Walk Test (Fast)     6 Minute Walk Test (6MWT)           Mcgee Balance Scale (BBS)     5 Times Sit-to-Stand (5TSTS)  43.33 seconds with BUE and bariatric FWW     Dynamic Gait Index (DGI)     Timed Up and Go (TUG) - sec 27.28 seconds with bariatric FWW   Single Leg Stance Right (sec)    Single Leg Stance Left (sec)    Modified CTSIB Conditions (sec) Cond 1:   Cond 2:   Cond 4:   Cond 5 :    Romberg  (sec)    Sharpened Romberg (sec)    30 Second Sit to Stand (reps/height)              Assessment & Plan   CLINICAL IMPRESSIONS  Medical Diagnosis: Primary osteoarthritis of both knees; weakness of both  lower extremities    Treatment Diagnosis: Impaired gait and balance   Impression/Assessment: Patient is a 68 year old male with impaired gait and mobility with bilateral lower extremity weakness complaints.  The following significant findings have been identified: Pain, Decreased ROM/flexibility, Decreased strength, Impaired balance, Impaired gait, Impaired muscle performance, and Decreased activity tolerance. These impairments interfere with their ability to perform self care tasks, work tasks, recreational activities, household chores, driving , household mobility, and community mobility as compared to previous level of function.     Clinical Decision Making (Complexity):  Clinical Presentation: Stable/Uncomplicated  Clinical Presentation Rationale: based on medical and personal factors listed in PT evaluation  Clinical Decision Making (Complexity): Moderate complexity    PLAN OF CARE  Treatment Interventions:  Modalities: Cryotherapy, E-stim, Hot Pack  Interventions: Gait Training, Manual Therapy, Neuromuscular Re-education, Therapeutic Activity, Therapeutic Exercise, Self-Care/Home Management    Long Term Goals     PT Goal 1  Goal Identifier: HEP  Goal Description: Pt will be independent with HEP to improve mobility and decrease pain.  Target Date: 05/09/24  PT Goal 2  Goal Identifier: TUG  Goal Description: Pt will complete TUG in less than 13 seconds to demontrate improved gait and balance to improve functional mobility and increase tolerance completing ADL's, recreational activities and work related tasks.  Target Date: 05/09/24  PT Goal 3  Goal Identifier: 5x STS  Goal Description: Pt will complete 5x STS in less than 30 seconds to demonstrate improved BLE strength to improve functional mobility and increase tolerance completing ADL's, recreational activities and work related tasks.  Target Date: 05/09/24      Frequency of Treatment: 1 time a week  Duration of Treatment: 1 time a week or every other week for  a minimum of 12 weeks, minimum of 6 sessions    Recommended Referrals to Other Professionals:  none  Education Assessment:   Learner/Method: Patient;Demonstration;Pictures/Video    Risks and benefits of evaluation/treatment have been explained.   Patient/Family/caregiver agrees with Plan of Care.     Evaluation Time:     PT Eval, Moderate Complexity Minutes (98767): 25   Present: Not applicable     Signing Clinician: Columba Rushing, PT      [facility]                                                                                   OUTPATIENT PHYSICAL THERAPY      PLAN OF TREATMENT FOR OUTPATIENT REHABILITATION   Patient's Last Name, First Name, BEAR CoatesMalcommaría elena Chilel YOB: 1955   Provider's Name   [facility]   Medical Record No.  [id number]     Onset Date: 02/06/24  Start of Care Date: 02/16/24     Medical Diagnosis:  Primary osteoarthritis of both knees; weakness of both lower extremities      PT Treatment Diagnosis:  Impaired gait and balance Plan of Treatment  Frequency/Duration: 1 time a week/ 1 time a week or every other week for a minimum of 12 weeks, minimum of 6 sessions    Certification date from 02/16/24 to 05/09/24         See note for plan of treatment details and functional goals     Columba Rushing, PT                         I CERTIFY THE NEED FOR THESE SERVICES FURNISHED UNDER        THIS PLAN OF TREATMENT AND WHILE UNDER MY CARE     (Physician attestation of this document indicates review and certification of the therapy plan).              Referring Provider:  Henrique Hartman    Initial Assessment  See Epic Evaluation- Start of Care Date: 02/16/24

## 2024-02-19 ENCOUNTER — TELEPHONE (OUTPATIENT)
Dept: FAMILY MEDICINE | Facility: CLINIC | Age: 69
End: 2024-02-19
Payer: COMMERCIAL

## 2024-02-19 NOTE — TELEPHONE ENCOUNTER
Disability claim forms/[employer] insurance company     Placed in  sign me folder       Please fax once completed to [phone]      Vanessa Mills    [facility]

## 2024-02-20 ENCOUNTER — TELEPHONE (OUTPATIENT)
Dept: FAMILY MEDICINE | Facility: CLINIC | Age: 69
End: 2024-02-20
Payer: COMMERCIAL

## 2024-02-20 NOTE — TELEPHONE ENCOUNTER
[id number] faxed a 90 day supply request for furosemide (LASIX) 20 MG tablet

## 2024-02-21 NOTE — TELEPHONE ENCOUNTER
Called pharmacy back - this is not a long term medication so 90 days is not appropriate.      Joi, RN    Triage Nurse  [facility]

## 2024-02-22 NOTE — TELEPHONE ENCOUNTER
TC called informed that he needs to come sign the form in order for us to be able fax     Will be coming to sign release     Made a copy for pt also       Vanessa Mills    [facility]

## 2024-02-23 ENCOUNTER — THERAPY VISIT (OUTPATIENT)
Dept: PHYSICAL THERAPY | Facility: REHABILITATION | Age: 69
End: 2024-02-23
Payer: COMMERCIAL

## 2024-02-23 DIAGNOSIS — R29.898 WEAKNESS OF BOTH LOWER EXTREMITIES: Primary | ICD-10-CM

## 2024-02-23 DIAGNOSIS — Z74.09 IMPAIRED FUNCTIONAL MOBILITY, BALANCE, GAIT, AND ENDURANCE: ICD-10-CM

## 2024-02-23 PROCEDURE — 97112 NEUROMUSCULAR REEDUCATION: CPT | Mod: GP

## 2024-02-23 PROCEDURE — 97116 GAIT TRAINING THERAPY: CPT | Mod: GP

## 2024-02-23 PROCEDURE — 97110 THERAPEUTIC EXERCISES: CPT | Mod: GP

## 2024-02-23 NOTE — TELEPHONE ENCOUNTER
Pt came to sign release forms faxed     Pt took copy     Copy made for chart       Vanessa Mills    [facility]

## 2024-03-01 ENCOUNTER — THERAPY VISIT (OUTPATIENT)
Dept: PHYSICAL THERAPY | Facility: REHABILITATION | Age: 69
End: 2024-03-01
Payer: COMMERCIAL

## 2024-03-01 DIAGNOSIS — R29.898 WEAKNESS OF BOTH LOWER EXTREMITIES: Primary | ICD-10-CM

## 2024-03-01 DIAGNOSIS — Z74.09 IMPAIRED FUNCTIONAL MOBILITY, BALANCE, GAIT, AND ENDURANCE: ICD-10-CM

## 2024-03-01 PROCEDURE — 97110 THERAPEUTIC EXERCISES: CPT | Mod: GP | Performed by: PHYSICAL THERAPIST

## 2024-03-05 ENCOUNTER — PATIENT OUTREACH (OUTPATIENT)
Dept: CARE COORDINATION | Facility: CLINIC | Age: 69
End: 2024-03-05
Payer: COMMERCIAL

## 2024-03-19 ENCOUNTER — PATIENT OUTREACH (OUTPATIENT)
Dept: CARE COORDINATION | Facility: CLINIC | Age: 69
End: 2024-03-19
Payer: COMMERCIAL

## 2024-03-20 ENCOUNTER — TELEPHONE (OUTPATIENT)
Dept: UROLOGY | Facility: CLINIC | Age: 69
End: 2024-03-20
Payer: COMMERCIAL

## 2024-03-20 NOTE — TELEPHONE ENCOUNTER
[facility] Call Center    Phone Message    May a detailed message be left on voicemail: no     Reason for Call: Other: Writer scheduled this patient tomorrow at 10:30 for a follow up with Sherrell Fields. Patient states that his incontinence is getting worse and his job wants to see if the doctor can advise.     Action Taken: Other: [facility]    Travel Screening: Not Applicable

## 2024-03-20 NOTE — TELEPHONE ENCOUNTER
Patient has been seen with provider in past.   Started on Vesicare and was doing well.     To keep appointment to discuss other options and/or work up    Johnny ROSAS RN  [facility]

## 2024-03-21 ENCOUNTER — OFFICE VISIT (OUTPATIENT)
Dept: UROLOGY | Facility: CLINIC | Age: 69
End: 2024-03-21
Payer: COMMERCIAL

## 2024-03-21 VITALS
BODY MASS INDEX: 40.43 KG/M2 | HEART RATE: 76 BPM | DIASTOLIC BLOOD PRESSURE: 74 MMHG | HEIGHT: 74 IN | SYSTOLIC BLOOD PRESSURE: 109 MMHG | WEIGHT: 315 LBS | TEMPERATURE: 97.6 F | OXYGEN SATURATION: 97 %

## 2024-03-21 DIAGNOSIS — N39.41 URGENCY INCONTINENCE: ICD-10-CM

## 2024-03-21 PROCEDURE — 99213 OFFICE O/P EST LOW 20 MIN: CPT | Performed by: STUDENT IN AN ORGANIZED HEALTH CARE EDUCATION/TRAINING PROGRAM

## 2024-03-21 RX ORDER — SOLIFENACIN SUCCINATE 10 MG/1
10 TABLET, FILM COATED ORAL DAILY
Qty: 90 TABLET | Refills: 1 | Status: SHIPPED | OUTPATIENT
Start: 2024-03-21

## 2024-03-21 ASSESSMENT — PAIN SCALES - GENERAL: PAINLEVEL: NO PAIN (0)

## 2024-03-21 NOTE — PROGRESS NOTES
"    UROLOGY FOLLOW-UP NOTE          Chief Complaint:   Today I had the pleasure of seeing . Malcom Coates in follow-up for a chief complaint of urinary incontinence.          Interval Update   Malcom Coates is a very pleasant 68 year old male with a history of dilated cardiomyopathy, T2 DM, osteoarthritis, and major depressive disorder.     Brief History: Mr. Malcom Coates was last seen by urology on 7/21/2023 for urinary incontinence. He was started on solifenacin 5 mg daily.     Today's notes: He initially noticed good benefit with solifenacin. He continues to report nocturia x 2-3. He was recently suspended from his job as a  due to urine odor and incontinence.          Physical Exam:   Patient is a 68 year old  male   Vitals: Blood pressure 109/74, pulse 76, temperature 97.6  F (36.4  C), temperature source Tympanic, height 1.88 m (6' 2\"), weight (!) 181.4 kg (400 lb), SpO2 97%.  General: Alert and oriented x 3, no acute distress.  Respiratory: Non-labored breathing.  Cardiac: Regular rate.      Labs and Pathology:    I personally reviewed all applicable laboratory data and went over findings with patient  Significant for:    CBC RESULTS:  Recent Labs   Lab Test 04/04/23  0915   WBC 6.8   HGB 15.9           BMP RESULTS:  Recent Labs   Lab Test 04/04/23  0915 12/19/19  1022    140   POTASSIUM 5.0 4.7   CHLORIDE 100 106   CO2 27 30   ANIONGAP 13 4   * 122*   BUN 20.5 25   CR 1.13 0.94   GFRESTIMATED 71 85   GFRESTBLACK  --  >90   ALBINO 10.3* 9.2       UA RESULTS:   Recent Labs   Lab Test 07/21/23  1235   SG >=1.030   URINEPH 5.5   NITRITE Negative   RBCU None Seen   WBCU 0-5       PSA RESULTS  PSA   Date Value Ref Range Status   12/19/2019 1.94 0 - 4 ug/L Final     Comment:     Assay Method:  Chemiluminescence using Siemens Vista analyzer   12/01/2011 0.78 0 - 4 ug/L Final     Prostate Specific Antigen Screen   Date Value Ref Range Status   04/04/2023 2.92 0.00 - " 4.50 ng/mL Final            Assessment/Plan   68 year old male seen in follow up for urinary incontinence. He was started on solifenacin 5 mg daily.     initially noticed good benefit with solifenacin. He continues to report nocturia x 2-3. He was recently suspended from his job as a  due to urine odor and incontinence.     We will increase his solifenacin dose to 10 mg daily. He was also provided with some absorptive pads to place on his seat. He would like to try a condom catheter. We have some samples in clinic that he was given to try. If this works well, he will call us and we will order more for long term use.     Plan:  Increase solifenacin dose to 10 mg daily.   Try condom catheter for use when working. Call if helpful and would like more ordered.   Follow up in three months, sooner if concerns.            Past Medical History:     Past Medical History:   Diagnosis Date    Cardiomyopathy (H) 2001    viral     DJD (degenerative joint disease) of knee     HTN (hypertension)     Hypertension goal BP (blood pressure) < 140/90 12/01/2011    Leg fracture, left     Moderate major depression (H) 12/01/2011    Obesity 12/01/2011    Osteoarthritis (arthritis due to wear and tear of joints) 12/01/2011    Small bowel obstruction (H)     Ventral hernia with obstruction             Past Surgical History:     Past Surgical History:   Procedure Laterality Date    HC CLOSED TX SHOULDER DISLOC W MANIP NO ANESTH      LT.    LAPAROSCOPY, SURGICAL; REPAIR INCISIONAL OR VENTRAL HERNIA  01/25/2024    ZZC OPEN TX TIBIAL FRACTURE PROXIMAL UNICONDYLAR  01/01/1976    left             Medications     Current Outpatient Medications   Medication    solifenacin (VESICARE) 5 MG tablet    atorvastatin (LIPITOR) 20 MG tablet    diclofenac (VOLTAREN) 1 % topical gel    furosemide (LASIX) 20 MG tablet    gabapentin (NEURONTIN) 300 MG capsule    lisinopril (ZESTRIL) 20 MG tablet    metFORMIN (GLUCOPHAGE) 500 MG tablet    metoprolol  succinate ER (TOPROL XL) 25 MG 24 hr tablet    polyethylene glycol (MIRALAX) 17 GM/Dose powder    rivaroxaban ANTICOAGULANT (XARELTO) 20 MG TABS tablet    senna (SENOKOT) 8.6 MG tablet     No current facility-administered medications for this visit.            Family History:     Family History   Problem Relation Age of Onset    Breast Cancer Mother     Cancer Mother         Brain    Diabetes Type 2  Mother     Unknown/Adopted Brother     Allergies Brother     Blood Disease Brother         AIDs    Allergies Brother     Asthma Brother             Social History:     Social History     Socioeconomic History    Marital status:      Spouse name: Not on file    Number of children: Not on file    Years of education: Not on file    Highest education level: Not on file   Occupational History    Not on file   Tobacco Use    Smoking status: Never     Passive exposure: Never    Smokeless tobacco: Never   Vaping Use    Vaping Use: Never used   Substance and Sexual Activity    Alcohol use: Yes     Comment: occassionally    Drug use: Never    Sexual activity: Not Currently     Partners: Female   Other Topics Concern    Parent/sibling w/ CABG, MI or angioplasty before 65F 55M? Not Asked   Social History Narrative    Not on file     Social Determinants of Health     Financial Resource Strain: Low Risk  (1/18/2024)    Financial Resource Strain     Within the past 12 months, have you or your family members you live with been unable to get utilities (heat, electricity) when it was really needed?: No   Food Insecurity: Low Risk  (1/18/2024)    Food Insecurity     Within the past 12 months, did you worry that your food would run out before you got money to buy more?: No     Within the past 12 months, did the food you bought just not last and you didn t have money to get more?: No   Transportation Needs: Low Risk  (1/18/2024)    Transportation Needs     Within the past 12 months, has lack of transportation kept you from medical  appointments, getting your medicines, non-medical meetings or appointments, work, or from getting things that you need?: No   Physical Activity: Not on file   Stress: Not on file   Social Connections: Not on file   Interpersonal Safety: Low Risk  (1/18/2024)    Interpersonal Safety     Do you feel physically and emotionally safe where you currently live?: Yes     Within the past 12 months, have you been hit, slapped, kicked or otherwise physically hurt by someone?: No     Within the past 12 months, have you been humiliated or emotionally abused in other ways by your partner or ex-partner?: No   Housing Stability: High Risk (1/18/2024)    Housing Stability     Do you have housing? : Yes     Are you worried about losing your housing?: Yes            Allergies:   Patient has no known allergies.         Review of Systems:  From intake questionnaire   Negative 14 system review except as noted on HPI, nurse's note.        KYLIE GRIGSBY PA-C  Department of Urology

## 2024-03-21 NOTE — LETTER
[facility]  [address] [address]  Phone: [phone]    March 21, 2024        Malcom Caotes  [address] [address]          To whom it may concern:    RE: Malcom Coates    This patient was evaluated in urology clinic on 3/21/2024. Plan of action for improving urinary incontinence includes:  Increasing the dose of medication intended to reduce incontinence.   Using absorbable pads to keep the chair dry.   Trying a catheter for urine collection to avoid urine leakage.     Please consider his return to work with implementation of these plans.     Contact me for questions or concerns.      Sincerely,      KYLIE GRIGSBY

## 2024-03-27 ENCOUNTER — TELEPHONE (OUTPATIENT)
Dept: UROLOGY | Facility: CLINIC | Age: 69
End: 2024-03-27
Payer: COMMERCIAL

## 2024-03-27 DIAGNOSIS — N39.41 URGENCY INCONTINENCE: Primary | ICD-10-CM

## 2024-03-27 NOTE — TELEPHONE ENCOUNTER
Reason for Call:  Other prescription    Detailed comments: After using the catheter samples given to him, patient called to confirm that he needs the 30ML standard length ordered. Please reach out to the patient with questions or concerns.    Phone Number Patient can be reached at: Home number on file [phone] (home)    Best Time: Anytime    Can we leave a detailed message on this number? YES    Thank you,  Tona CARL [facility]  Specialty [facility] - [facility]        Call taken on 3/27/2024 at 10:40 AM by Tona Colon

## 2024-03-28 ENCOUNTER — MEDICAL CORRESPONDENCE (OUTPATIENT)
Dept: HEALTH INFORMATION MANAGEMENT | Facility: CLINIC | Age: 69
End: 2024-03-28
Payer: COMMERCIAL

## 2024-03-28 NOTE — TELEPHONE ENCOUNTER
Reason for Call:  Other call back    Detailed comments: Patient was inquiring how soon catheters would be available? Otherwise he was wondering if there were any samples he could use, since he would like to return to work on Monday, 4/1/24. Please reach out to patient when able.    Phone Number Patient can be reached at: Home number on file [phone] (home)    Best Time: Anytime    Can we leave a detailed message on this number? YES    Thank you,  Tona CARL [facility]  Specialty Clinic - [facility]      Call taken on 3/28/2024 at 11:15 AM by Tona Colon

## 2024-03-28 NOTE — TELEPHONE ENCOUNTER
Called [employer] and was transferred to  line. No answer from line, did not leave voicemail. Will wait to hear back from [facility] urology [facility] specialist.     Placed call to patient. Patient stated that he had a difficult time with the 25mm condom catheter and that he liked the 30mm the best. Patient stated that he believes that the condom catheters will be the fix to his problem and will need more by the time he starts work again on Monday. Patient stated that he received a call from a female  with [employer]. Patient stated that the  needs to go through patient's insurance before order can be filled. Patient stated that he is wondering if the clinic has any samples he could have at this time. Patient informed that unfortunately the clinic has limited condom catheter supplies and does not have any 30mm at this time. Patient stated that there is a number to call on the back of his condom catheters for [employer] to call for samples. Patient asked if he could call that phone number to request samples until his order with [employer] [facility], but was informed that if he does that he may be set up with [employer] for order and will interfere with order with [employer] [facility]. Patient stated that he will wait to hear back from [facility]     Patient stated that he also picked up his medications and has started the doubled dose. Patient stated that he also bought a water proof seat cover that he feels will help too.     Ayah VAUGHAN CMA 03/28/24 3:45 PM

## 2024-03-28 NOTE — TELEPHONE ENCOUNTER
Faxed condom catheter order to [employer] [facility].     Left voicemail for [facility] urology territory specialist to call back, asking if patient can get samples sent to him over the weekend.    Ayah VAUGHAN CMA 03/28/24 12:06 PM

## 2024-04-01 ENCOUNTER — ANCILLARY PROCEDURE (OUTPATIENT)
Dept: CARDIOLOGY | Facility: CLINIC | Age: 69
End: 2024-04-01
Attending: INTERNAL MEDICINE
Payer: COMMERCIAL

## 2024-04-01 DIAGNOSIS — I10 ESSENTIAL HYPERTENSION: ICD-10-CM

## 2024-04-01 DIAGNOSIS — I48.91 ATRIAL FIBRILLATION, UNSPECIFIED TYPE (H): ICD-10-CM

## 2024-04-01 DIAGNOSIS — I42.0 DILATED CARDIOMYOPATHY (H): ICD-10-CM

## 2024-04-01 LAB — LVEF ECHO: NORMAL

## 2024-04-01 PROCEDURE — 93306 TTE W/DOPPLER COMPLETE: CPT | Performed by: INTERNAL MEDICINE

## 2024-04-02 NOTE — TELEPHONE ENCOUNTER
Spoke to [employer] representative who stated patient received his supplies.     Ayah VAUGHAN CMA 04/02/24 9:06 AM

## 2024-04-09 DIAGNOSIS — I10 ESSENTIAL HYPERTENSION: ICD-10-CM

## 2024-04-09 DIAGNOSIS — E11.00 TYPE 2 DIABETES MELLITUS WITH HYPEROSMOLARITY WITHOUT COMA, WITHOUT LONG-TERM CURRENT USE OF INSULIN (H): ICD-10-CM

## 2024-04-10 DIAGNOSIS — E78.5 DYSLIPIDEMIA: ICD-10-CM

## 2024-04-10 RX ORDER — ATORVASTATIN CALCIUM 20 MG/1
20 TABLET, FILM COATED ORAL AT BEDTIME
Qty: 90 TABLET | Refills: 3 | Status: SHIPPED | OUTPATIENT
Start: 2024-04-10 | End: 2024-05-29

## 2024-04-10 RX ORDER — LISINOPRIL 20 MG/1
20 TABLET ORAL DAILY
Qty: 90 TABLET | Refills: 3 | Status: SHIPPED | OUTPATIENT
Start: 2024-04-10 | End: 2024-05-29

## 2024-04-11 NOTE — TELEPHONE ENCOUNTER
Spoke to [address] [place] [address] urology territory specialist, Sergey. Sergey stated that more free samples are being shipped to patient as of this morning when patient called to request more. Sergey stated that patient's [employer] [facility] is out of network for patient and that all of the necessary paperwork was sent over to in network company. Sergey stated that at this time patient is waiting for new company to get order completed.    Ayah VAUGHAN CMA 04/11/24 11:51 AM

## 2024-04-11 NOTE — TELEPHONE ENCOUNTER
Reason for Call:  Other call back    Detailed comments: Patient has still not been able fill his prescription for catheters, and is wondering if we had any 30mm standard samples? His prescription is still being approved, so he is unable to receive any currently. Please call patient back when able.    Phone Number Patient can be reached at: Cell number on file:    Telephone Information:   Mobile [phone]       Best Time: Anytime    Can we leave a detailed message on this number? YES    Call taken on 4/11/2024 at 10:40 AM by Tona Colon

## 2024-04-11 NOTE — TELEPHONE ENCOUNTER
Spoke to patient to confirm he was told that free samples from [facility] are being shipped to him. Patient stated he was called earlier today and was told a shipment of about five condom catheters will be sent out with one day mail. Patient stated he has three more condom catheters left for today, Friday, and Monday. Patient stated the new company that he was referred to that is in network is called [employer] phone number [phone].     Patient stated [employer] keeps telling him that his order is still in process. Patient stated that he has had wonderful experinence with the representatives with [employer], but [employer] seems like they are short staffed.     At this time patient will have enough condom catheters to last him through next week. Patient will reach out if [employer] still has not processed order.     Ayah VAUGHAN CMA 04/11/24 12:01 PM

## 2024-04-11 NOTE — TELEPHONE ENCOUNTER
Called [facility] at [phone] and spoke to representative. Staff stated that order is on hold due to problem with insurance. Staff stated a new order needs to be faxed over to [phone].    New order placed and faxed.    yAah VAUGHAN CMA 04/11/24 12:12 PM

## 2024-04-18 ENCOUNTER — MEDICAL CORRESPONDENCE (OUTPATIENT)
Dept: HEALTH INFORMATION MANAGEMENT | Facility: CLINIC | Age: 69
End: 2024-04-18
Payer: COMMERCIAL

## 2024-04-26 ENCOUNTER — MEDICAL CORRESPONDENCE (OUTPATIENT)
Dept: HEALTH INFORMATION MANAGEMENT | Facility: CLINIC | Age: 69
End: 2024-04-26

## 2024-04-29 ENCOUNTER — VIRTUAL VISIT (OUTPATIENT)
Dept: CARDIOLOGY | Facility: CLINIC | Age: 69
End: 2024-04-29
Attending: INTERNAL MEDICINE
Payer: COMMERCIAL

## 2024-04-29 VITALS — BODY MASS INDEX: 39.17 KG/M2 | HEIGHT: 75 IN | WEIGHT: 315 LBS

## 2024-04-29 DIAGNOSIS — I42.0 DILATED CARDIOMYOPATHY (H): ICD-10-CM

## 2024-04-29 DIAGNOSIS — I48.91 ATRIAL FIBRILLATION, UNSPECIFIED TYPE (H): Primary | ICD-10-CM

## 2024-04-29 PROCEDURE — 99441 PR PHYSICIAN TELEPHONE EVALUATION 5-10 MIN: CPT | Mod: 93 | Performed by: INTERNAL MEDICINE

## 2024-04-29 ASSESSMENT — PAIN SCALES - GENERAL: PAINLEVEL: MILD PAIN (3)

## 2024-04-29 NOTE — LETTER
"4/29/2024      RE: Malcom Coates  [address]  [place] [address]       Dear Colleague,    Thank you for the opportunity to participate in the care of your patient, Malcom Coates, at the [facility]. Please see a copy of my visit note below.    Electrophysiology Clinic Telephone Visit    Malcom Coates is a 68 year old male who is being evaluated via a billable telephone visit.      The patient has been notified of following:     \"This telephone visit will be conducted via a call between you and your physician/provider. We have found that certain health care needs can be provided without the need for a physical exam.  This service lets us provide the care you need with a short phone conversation.  If a prescription is necessary we can send it directly to your pharmacy.  If lab work is needed we can place an order for that and you can then stop by our lab to have the test done at a later time.    If during the course of the call the physician/provider feels a telephone visit is not appropriate, you will not be charged for this service.\"   Patient has given verbal consent for Telephone visit?  Yes    HPI: Purpose of visit: Follow-up for atrial fibrillation    Patient is a 68-year-old gentleman with past medical history that includes viral myocarditis about 20 years ago, elevated BMI, type 2 diabetes, and recent hernia surgery.     On January 16, 2024, patient presented to the emergency department for evaluation of pain in the left foot.  He was found to have atrial fibrillation that was asymptomatic.  Patient did not report any symptoms of irregular heartbeat sensation, potation's, exertional dyspnea, exertional angina, frequent lightheadedness, presyncope or syncope.  His heart rate was controlled at 85 bpm.    Patient's last visit with me was on February 12, 2024.  Since the last visit, patient " has been doing well.  Patient underwent a transthoracic echocardiogram that showed a normal ejection fraction at 55 to 60%.  The aortic root measured 4.3 cm, but is normal based on indexed value.          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      DJD (degenerative joint disease) of knee      HTN (hypertension)      Hypertension goal BP (blood pressure) < 140/90 12/01/2011     Leg fracture, left      Moderate major depression (H) 12/01/2011     Obesity 12/01/2011     Osteoarthritis (arthritis due to wear and tear of joints) 12/01/2011     Small bowel obstruction (H)      Ventral hernia with obstruction        CURRENT MEDICATIONS:  Current Outpatient Medications   Medication Sig Dispense Refill     atorvastatin (LIPITOR) 20 MG tablet TAKE 1 TABLET BY MOUTH AT BEDTIME 90 tablet 3     diclofenac (VOLTAREN) 1 % topical gel Apply 2 g topically as needed       lisinopril (ZESTRIL) 20 MG tablet TAKE 1 TABLET BY MOUTH EVERY DAY 90 tablet 3     metFORMIN (GLUCOPHAGE) 500 MG tablet TAKE 1 TABLET BY MOUTH TWICE A DAY WITH MEALS 180 tablet 3     metoprolol succinate ER (TOPROL XL) 25 MG 24 hr tablet Take 1 tablet by mouth daily       rivaroxaban ANTICOAGULANT (XARELTO) 20 MG TABS tablet Take 20 mg by mouth       solifenacin (VESICARE) 10 MG tablet Take 1 tablet (10 mg) by mouth daily 90 tablet 1     furosemide (LASIX) 20 MG tablet One tab daily for 3- 5 days, for lower ext edema, then daily as needed. 30 tablet 0     gabapentin (NEURONTIN) 300 MG capsule Changed to one tab at bedtime ( decreased dose ) 90 capsule 0     polyethylene glycol (MIRALAX) 17 GM/Dose powder Take 17 g by mouth 2 times daily as needed       senna (SENOKOT) 8.6 MG tablet Take 8.6 mg by mouth 2 times daily as needed for constipation         PAST SURGICAL HISTORY:  Past Surgical History:   Procedure Laterality Date     HC CLOSED TX SHOULDER DISLOC W MANIP NO ANESTH      LT.     LAPAROSCOPY,  SURGICAL; REPAIR INCISIONAL OR VENTRAL HERNIA  01/25/2024     ZZC OPEN TX TIBIAL FRACTURE PROXIMAL UNICONDYLAR  01/01/1976    left        ALLERGIES:   No Known Allergies    FAMILY HISTORY:  Family History   Problem Relation Age of Onset     Breast Cancer Mother      Cancer Mother         Brain     Diabetes Type 2  Mother      Unknown/Adopted Brother      Allergies Brother      Blood Disease Brother         AIDs     Allergies Brother      Asthma Brother        SOCIAL HISTORY:  Social History     Tobacco Use     Smoking status: Never     Passive exposure: Never     Smokeless tobacco: Never   Vaping Use     Vaping status: Never Used   Substance Use Topics     Alcohol use: Yes     Comment: occassionally     Drug use: Never       ROS:  10 point ROS neg other than the symptoms noted above in the HPI.    Labs:  Reviewed.     Testing/Procedures:  PULMONARY FUNCTION TESTS:        No data to display                    Assessment and Plan:   Asymptomatic permanent atrial fibrillation      It is encouraging that patient remains asymptomatic and the rate is well-controlled.  Patient will continue current medications and we will renew prescription for Xarelto.  We will see patient again in 1 year by video and prior to that visit, patient will undergo a transthoracic echocardiogram to evaluate the aortic root.  All questions and concerns were addressed and the patient was happy with the plan.          Telephone Visit Duration: 8 minutes          Please do not hesitate to contact me if you have any questions/concerns.     Sincerely,     Sandy Medina MD

## 2024-04-29 NOTE — PATIENT INSTRUCTIONS
Thank you for coming to the [facility]; please note the following instructions:    1. Follow-up virtually in 1 year with an echo prior.  The cardiology team will reach out to schedule when the time gets closer.        If you have any questions regarding your visit please contact your care team:     Cardiology  Telephone Number   Sandy VIVEKZuri, RN  Isi MCKEON, RN  Salima GILLIAM, RN  Xiomara ALVAREZ, RMA  Alfreda DREW, ROSENDA VARGAS, Visit Facilitator  Eugenie VELIZ [facility] [phone] (option 1)   For scheduling appts:     [phone] (select option 1)       For the Device Clinic (Pacemakers and ICD's)  RN's :  Tona Herrera   During business hours: [phone]    *After business hours:  [phone] (select option 4)      Normal test result notifications will be released via [email or web address] or mailed within 7 business days.  All other test results, will be communicated via telephone once reviewed by your cardiologist.    If you need a medication refill please contact your pharmacy.  Please allow 3 business days for your refill to be completed.    As always, thank you for trusting us with your health care needs!

## 2024-04-29 NOTE — PROGRESS NOTES
"Electrophysiology Clinic Telephone Visit    Malcom Coates is a 68 year old male who is being evaluated via a billable telephone visit.      The patient has been notified of following:     \"This telephone visit will be conducted via a call between you and your physician/provider. We have found that certain health care needs can be provided without the need for a physical exam.  This service lets us provide the care you need with a short phone conversation.  If a prescription is necessary we can send it directly to your pharmacy.  If lab work is needed we can place an order for that and you can then stop by our lab to have the test done at a later time.    If during the course of the call the physician/provider feels a telephone visit is not appropriate, you will not be charged for this service.\"   Patient has given verbal consent for Telephone visit?  Yes    HPI: Purpose of visit: Follow-up for atrial fibrillation    Patient is a 68-year-old gentleman with past medical history that includes viral myocarditis about 20 years ago, elevated BMI, type 2 diabetes, and recent hernia surgery.     On January 16, 2024, patient presented to the emergency department for evaluation of pain in the left foot.  He was found to have atrial fibrillation that was asymptomatic.  Patient did not report any symptoms of irregular heartbeat sensation, potation's, exertional dyspnea, exertional angina, frequent lightheadedness, presyncope or syncope.  His heart rate was controlled at 85 bpm.    Patient's last visit with me was on February 12, 2024.  Since the last visit, patient has been doing well.  Patient underwent a transthoracic echocardiogram that showed a normal ejection fraction at 55 to 60%.  The aortic root measured 4.3 cm, but is normal based on indexed value.          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      DJD (degenerative joint disease) of knee      HTN " (hypertension)      Hypertension goal BP (blood pressure) < 140/90 12/01/2011     Leg fracture, left      Moderate major depression (H) 12/01/2011     Obesity 12/01/2011     Osteoarthritis (arthritis due to wear and tear of joints) 12/01/2011     Small bowel obstruction (H)      Ventral hernia with obstruction        CURRENT MEDICATIONS:  Current Outpatient Medications   Medication Sig Dispense Refill     atorvastatin (LIPITOR) 20 MG tablet TAKE 1 TABLET BY MOUTH AT BEDTIME 90 tablet 3     diclofenac (VOLTAREN) 1 % topical gel Apply 2 g topically as needed       lisinopril (ZESTRIL) 20 MG tablet TAKE 1 TABLET BY MOUTH EVERY DAY 90 tablet 3     metFORMIN (GLUCOPHAGE) 500 MG tablet TAKE 1 TABLET BY MOUTH TWICE A DAY WITH MEALS 180 tablet 3     metoprolol succinate ER (TOPROL XL) 25 MG 24 hr tablet Take 1 tablet by mouth daily       rivaroxaban ANTICOAGULANT (XARELTO) 20 MG TABS tablet Take 20 mg by mouth       solifenacin (VESICARE) 10 MG tablet Take 1 tablet (10 mg) by mouth daily 90 tablet 1     furosemide (LASIX) 20 MG tablet One tab daily for 3- 5 days, for lower ext edema, then daily as needed. 30 tablet 0     gabapentin (NEURONTIN) 300 MG capsule Changed to one tab at bedtime ( decreased dose ) 90 capsule 0     polyethylene glycol (MIRALAX) 17 GM/Dose powder Take 17 g by mouth 2 times daily as needed       senna (SENOKOT) 8.6 MG tablet Take 8.6 mg by mouth 2 times daily as needed for constipation         PAST SURGICAL HISTORY:  Past Surgical History:   Procedure Laterality Date     HC CLOSED TX SHOULDER DISLOC W MANIP NO ANESTH      LT.     LAPAROSCOPY, SURGICAL; REPAIR INCISIONAL OR VENTRAL HERNIA  01/25/2024     [facility] OPEN TX TIBIAL FRACTURE PROXIMAL UNICONDYLAR  01/01/1976    left        ALLERGIES:   No Known Allergies    FAMILY HISTORY:  Family History   Problem Relation Age of Onset     Breast Cancer Mother      Cancer Mother         Brain     Diabetes Type 2  Mother      Unknown/Adopted Brother       Allergies Brother      Blood Disease Brother         AIDs     Allergies Brother      Asthma Brother        SOCIAL HISTORY:  Social History     Tobacco Use     Smoking status: Never     Passive exposure: Never     Smokeless tobacco: Never   Vaping Use     Vaping status: Never Used   Substance Use Topics     Alcohol use: Yes     Comment: occassionally     Drug use: Never       ROS:  10 point ROS neg other than the symptoms noted above in the HPI.    Labs:  Reviewed.     Testing/Procedures:  PULMONARY FUNCTION TESTS:        No data to display                    Assessment and Plan:   Asymptomatic permanent atrial fibrillation      It is encouraging that patient remains asymptomatic and the rate is well-controlled.  Patient will continue current medications and we will renew prescription for Xarelto.  We will see patient again in 1 year by video and prior to that visit, patient will undergo a transthoracic echocardiogram to evaluate the aortic root.  All questions and concerns were addressed and the patient was happy with the plan.          Telephone Visit Duration: 8 minutes

## 2024-06-15 ENCOUNTER — HEALTH MAINTENANCE LETTER (OUTPATIENT)
Age: 69
End: 2024-06-15

## 2024-06-17 ASSESSMENT — PATIENT HEALTH QUESTIONNAIRE - PHQ9
10. IF YOU CHECKED OFF ANY PROBLEMS, HOW DIFFICULT HAVE THESE PROBLEMS MADE IT FOR YOU TO DO YOUR WORK, TAKE CARE OF THINGS AT HOME, OR GET ALONG WITH OTHER PEOPLE: EXTREMELY DIFFICULT
SUM OF ALL RESPONSES TO PHQ QUESTIONS 1-9: 24
SUM OF ALL RESPONSES TO PHQ QUESTIONS 1-9: 24

## 2024-06-18 ENCOUNTER — OFFICE VISIT (OUTPATIENT)
Dept: FAMILY MEDICINE | Facility: CLINIC | Age: 69
End: 2024-06-18
Payer: COMMERCIAL

## 2024-06-18 VITALS
HEIGHT: 75 IN | BODY MASS INDEX: 39.17 KG/M2 | DIASTOLIC BLOOD PRESSURE: 88 MMHG | OXYGEN SATURATION: 98 % | HEART RATE: 73 BPM | WEIGHT: 315 LBS | SYSTOLIC BLOOD PRESSURE: 138 MMHG | RESPIRATION RATE: 15 BRPM | TEMPERATURE: 96.4 F

## 2024-06-18 DIAGNOSIS — E11.00 TYPE 2 DIABETES MELLITUS WITH HYPEROSMOLARITY WITHOUT COMA, WITHOUT LONG-TERM CURRENT USE OF INSULIN (H): Primary | ICD-10-CM

## 2024-06-18 DIAGNOSIS — M17.11 PRIMARY OSTEOARTHRITIS OF RIGHT KNEE: ICD-10-CM

## 2024-06-18 DIAGNOSIS — Z23 NEED FOR VACCINATION: ICD-10-CM

## 2024-06-18 DIAGNOSIS — M17.12 PRIMARY OSTEOARTHRITIS OF LEFT KNEE: ICD-10-CM

## 2024-06-18 DIAGNOSIS — I48.91 ATRIAL FIBRILLATION, UNSPECIFIED TYPE (H): ICD-10-CM

## 2024-06-18 DIAGNOSIS — R43.0 LOSS OF SMELL: ICD-10-CM

## 2024-06-18 DIAGNOSIS — R32 URINARY INCONTINENCE, UNSPECIFIED TYPE: ICD-10-CM

## 2024-06-18 DIAGNOSIS — Z97.8 FOLEY CATHETER IN PLACE: ICD-10-CM

## 2024-06-18 LAB
ANION GAP SERPL CALCULATED.3IONS-SCNC: 11 MMOL/L (ref 7–15)
BUN SERPL-MCNC: 29.5 MG/DL (ref 8–23)
CALCIUM SERPL-MCNC: 10 MG/DL (ref 8.8–10.2)
CHLORIDE SERPL-SCNC: 103 MMOL/L (ref 98–107)
CHOLEST SERPL-MCNC: 169 MG/DL
CREAT SERPL-MCNC: 1.15 MG/DL (ref 0.67–1.17)
DEPRECATED HCO3 PLAS-SCNC: 22 MMOL/L (ref 22–29)
EGFRCR SERPLBLD CKD-EPI 2021: 69 ML/MIN/1.73M2
FASTING STATUS PATIENT QL REPORTED: NO
FASTING STATUS PATIENT QL REPORTED: NO
GLUCOSE SERPL-MCNC: 100 MG/DL (ref 70–99)
HBA1C MFR BLD: 6.7 % (ref 0–5.6)
HDLC SERPL-MCNC: 30 MG/DL
LDLC SERPL CALC-MCNC: 107 MG/DL
NONHDLC SERPL-MCNC: 139 MG/DL
POTASSIUM SERPL-SCNC: 4.8 MMOL/L (ref 3.4–5.3)
SODIUM SERPL-SCNC: 136 MMOL/L (ref 135–145)
TRIGL SERPL-MCNC: 161 MG/DL

## 2024-06-18 PROCEDURE — 80048 BASIC METABOLIC PNL TOTAL CA: CPT | Performed by: FAMILY MEDICINE

## 2024-06-18 PROCEDURE — 82570 ASSAY OF URINE CREATININE: CPT | Performed by: FAMILY MEDICINE

## 2024-06-18 PROCEDURE — 82043 UR ALBUMIN QUANTITATIVE: CPT | Performed by: FAMILY MEDICINE

## 2024-06-18 PROCEDURE — 99214 OFFICE O/P EST MOD 30 MIN: CPT | Mod: 25 | Performed by: FAMILY MEDICINE

## 2024-06-18 PROCEDURE — 80061 LIPID PANEL: CPT | Performed by: FAMILY MEDICINE

## 2024-06-18 PROCEDURE — 90714 TD VACC NO PRESV 7 YRS+ IM: CPT | Mod: GZ | Performed by: FAMILY MEDICINE

## 2024-06-18 PROCEDURE — 20610 DRAIN/INJ JOINT/BURSA W/O US: CPT | Mod: 50 | Performed by: FAMILY MEDICINE

## 2024-06-18 PROCEDURE — 36415 COLL VENOUS BLD VENIPUNCTURE: CPT | Performed by: FAMILY MEDICINE

## 2024-06-18 PROCEDURE — 83036 HEMOGLOBIN GLYCOSYLATED A1C: CPT | Performed by: FAMILY MEDICINE

## 2024-06-18 PROCEDURE — 90471 IMMUNIZATION ADMIN: CPT | Mod: GZ | Performed by: FAMILY MEDICINE

## 2024-06-18 RX ORDER — TRIAMCINOLONE ACETONIDE 40 MG/ML
40 INJECTION, SUSPENSION INTRA-ARTICULAR; INTRAMUSCULAR ONCE
Status: COMPLETED | OUTPATIENT
Start: 2024-06-18 | End: 2024-06-18

## 2024-06-18 RX ORDER — RESPIRATORY SYNCYTIAL VIRUS VACCINE 120MCG/0.5
0.5 KIT INTRAMUSCULAR ONCE
Qty: 1 EACH | Refills: 0 | Status: CANCELLED | OUTPATIENT
Start: 2024-06-18 | End: 2024-06-18

## 2024-06-18 RX ADMIN — TRIAMCINOLONE ACETONIDE 40 MG: 40 INJECTION, SUSPENSION INTRA-ARTICULAR; INTRAMUSCULAR at 17:43

## 2024-06-18 ASSESSMENT — PAIN SCALES - GENERAL: PAINLEVEL: NO PAIN (0)

## 2024-06-18 NOTE — LETTER
June 18, 2024      Malcom Coates  [address] [address]        To Whom It May Concern:    Malcom Coates was seen in our clinic. He may return to work without restrictions.  Was seen today at our clinic  History of loss of smell, likely, long COVID symptoms.  History of osteoarthritis of both knees, been treated with Cortisone injections and symptoms been well controlled.    His above conditions, does not prevent him form working as a .  He  is cleared to return to work as a  with no restriction.      Sincerely,        Henrique Hartman MD

## 2024-06-18 NOTE — PROGRESS NOTES
Assessment & Plan     Type 2 diabetes mellitus with hyperosmolarity without coma, without long-term current use of insulin (H)  A1c is better down to 6.7  Patient has been working on his weight and his diet, he has lost almost 40 pounds.  Continue with current diet plane  Continue with current medication  - Albumin Random Urine Quantitative with Creat Ratio; Future  - HEMOGLOBIN A1C; Future  - BASIC METABOLIC PANEL; Future  - Lipid panel reflex to direct LDL Non-fasting; Future  - Albumin Random Urine Quantitative with Creat Ratio  - HEMOGLOBIN A1C  - BASIC METABOLIC PANEL  - Lipid panel reflex to direct LDL Non-fasting    Primary osteoarthritis of right knee  - triamcinolone (KENALOG-40) injection 40 mg  - DRAIN/INJECT LARGE JOINT/BURSA    Primary osteoarthritis of left knee    - triamcinolone (KENALOG-40) injection 40 mg  - DRAIN/INJECT LARGE JOINT/BURSA      After discussion of various treatment options, and after reviewing risks and benefits and potential complications. And after signing a consent form, we elected to proceed with a cortisone injection for both knees.  The lateral aspect of both knee was cleaned , Betadine and Alcohol, then injected with 40 mg of Kenalog suspension along with 3 cc of 2% Lidocaine.  Patient tolerated injection well, givne post injection instructions.       Atrial fibrillation, unspecified type (H)  Stable, continue with current medications    Need for vaccination  Tdp booter today.    Colón catheter in place, condom  In good place    Urinary incontinence, unspecified type  Follow up with urology.        Patient denies ever Colón catheter in place, he does follow-up with urology.    He did ask for a letter for work.  He has been off work for the past 2 weeks.  He does work as a .  Given a letter to go back to work, with no restriction.    He does report he been having loss of smell for the past 2 years since he was infected with COVID.    Karrie Woody is a 68 year  "old, presenting for the following health issues:  Pain (In knees, need injection  ) and loss of smell since covid       6/18/2024     3:49 PM   Additional Questions   Roomed by Laura VELIZ     Pain    History of Present Illness       Reason for visit:  Shots for knees and loss of smell and return to work letter    He eats 0-1 servings of fruits and vegetables daily.He consumes 0 sweetened beverage(s) daily.He exercises with enough effort to increase his heart rate 9 or less minutes per day.  He exercises with enough effort to increase his heart rate 3 or less days per week. He is missing 1 dose(s) of medications per week.  He is not taking prescribed medications regularly due to cost of medication.         Review of Systems  Constitutional, neuro, ENT, endocrine, pulmonary, cardiac, gastrointestinal, genitourinary, musculoskeletal, integument and psychiatric systems are negative, except as otherwise noted.      Objective    BP (!) 140/90 (BP Location: Right arm, Patient Position: Sitting, Cuff Size: Adult Large)   Pulse 73   Temp (!) 96.4  F (35.8  C) (Tympanic)   Resp 15   Ht 1.905 m (6' 3\")   Wt (!) 164.7 kg (363 lb)   SpO2 98%   BMI 45.37 kg/m    Body mass index is 45.37 kg/m .  Physical Exam   GENERAL: alert and no distress  MS: no gross musculoskeletal defects noted, no edema  MS: both knees exam:  No effusion, positive for crepitation, positive for stiffness with flexion and extension  SKIN: no suspicious lesions or rashes    Orders Placed This Encounter   Procedures    DRAIN/INJECT LARGE JOINT/BURSA    REVIEW OF HEALTH MAINTENANCE PROTOCOL ORDERS    TD,PF 7+(TENIVAC)    Albumin Random Urine Quantitative with Creat Ratio    HEMOGLOBIN A1C    BASIC METABOLIC PANEL    Lipid panel reflex to direct LDL Non-fasting      Lab Results   Component Value Date    A1C 6.7 06/18/2024    A1C 7.4 04/04/2023    A1C 6.3 12/19/2019             Signed Electronically by: Henrique Hartman MD    "

## 2024-06-19 DIAGNOSIS — E78.5 DYSLIPIDEMIA: ICD-10-CM

## 2024-06-19 LAB
CREAT UR-MCNC: 129 MG/DL
MICROALBUMIN UR-MCNC: 18.8 MG/L
MICROALBUMIN/CREAT UR: 14.57 MG/G CR (ref 0–17)

## 2024-06-19 RX ORDER — ATORVASTATIN CALCIUM 40 MG/1
40 TABLET, FILM COATED ORAL AT BEDTIME
Qty: 90 TABLET | Refills: 3 | Status: SHIPPED | OUTPATIENT
Start: 2024-06-19 | End: 2024-09-20

## 2024-06-20 ENCOUNTER — TELEPHONE (OUTPATIENT)
Dept: FAMILY MEDICINE | Facility: CLINIC | Age: 69
End: 2024-06-20
Payer: COMMERCIAL

## 2024-06-20 NOTE — TELEPHONE ENCOUNTER
"Attempt # 1 to reach patient.    Left VM to return call to clinic.    Henrique Hartman MD  [facility]  Please call pt with results    Normal for Kidney and liver functions.  Normal for blood sugar and electrolyte.    Total Cholesterol , and LDL elevated    I have increased Atorvastatin to 40 mg , at bedtime. ( Sent a new dose )    The 10-year ASCVD risk score (Sherita DK, et al., 2019) is: 43%    Values used to calculate the score:      Age: 68 years      Sex: Male      Is Non- : No      Diabetic: Yes      Tobacco smoker: No      Systolic Blood Pressure: 138 mmHg      Is BP treated: Yes      HDL Cholesterol: 30 mg/dL      Total Cholesterol: 169 mg/dL    Ways to improve your cholesterol...     1- Eats less saturated fats (including avoiding \"trans\" fats), fatty foods.  Eat more baked food, than fried food.     2 - Eat more unsaturated fats  - found in vegetables, grains, and tree nuts.  Also by replacing butter with canola oil or olive oil.     3 - Eat more nuts.  1-2 ounces (a small handful) of almonds, walnuts, hazelnuts or pecans once a day in place of other less healthy snacks.     4 - Eat more high fiber foods - vegetables and whole grains including oat bran, oats, beans, peas, and flax seed.     5 - Eat more fish - such as salmon, tuna, mackerel, and sardines.  1 or 2 six ounce servings per week is a healthy replacement for other proteins.     6 - Exercise for at least 120 minutes per week - which is equal to 30 minutes 4 days per week.    7- avoid late meals, at least  a few hours before bedtime.       Follow up in 6 months    Christine M Klisch, RN    "

## 2024-06-20 NOTE — TELEPHONE ENCOUNTER
Patient returned call. Notified of provider message as written. Patient verbalized good understanding.     Micaela SHER, RN  [facility]

## 2024-06-21 PROBLEM — Z74.09 IMPAIRED FUNCTIONAL MOBILITY, BALANCE, GAIT, AND ENDURANCE: Status: RESOLVED | Noted: 2024-02-23 | Resolved: 2024-06-21

## 2024-06-21 PROBLEM — R29.898 WEAKNESS OF BOTH LOWER EXTREMITIES: Status: RESOLVED | Noted: 2024-02-23 | Resolved: 2024-06-21

## 2024-06-21 NOTE — PROGRESS NOTES
DISCHARGE  Reason for Discharge: Patient chooses to discontinue therapy.  Patient has failed to schedule further appointments.  Pt unable to make it to future PT appointments due to work.    Equipment Issued: none    Discharge Plan: Patient to continue home program.  Pt to receive new referral for PT from doctor to schedule more PT appointments.    Referring Provider:  Henrique Hartman

## 2024-06-28 ENCOUNTER — MEDICAL CORRESPONDENCE (OUTPATIENT)
Dept: HEALTH INFORMATION MANAGEMENT | Facility: CLINIC | Age: 69
End: 2024-06-28
Payer: COMMERCIAL

## 2024-07-05 ENCOUNTER — MYC MEDICAL ADVICE (OUTPATIENT)
Dept: FAMILY MEDICINE | Facility: CLINIC | Age: 69
End: 2024-07-05
Payer: COMMERCIAL

## 2024-07-12 ENCOUNTER — OFFICE VISIT (OUTPATIENT)
Dept: FAMILY MEDICINE | Facility: CLINIC | Age: 69
End: 2024-07-12
Payer: COMMERCIAL

## 2024-07-12 ENCOUNTER — PATIENT OUTREACH (OUTPATIENT)
Dept: CARE COORDINATION | Facility: CLINIC | Age: 69
End: 2024-07-12

## 2024-07-12 VITALS
HEIGHT: 75 IN | WEIGHT: 315 LBS | BODY MASS INDEX: 39.17 KG/M2 | OXYGEN SATURATION: 98 % | TEMPERATURE: 97.9 F | RESPIRATION RATE: 24 BRPM | DIASTOLIC BLOOD PRESSURE: 84 MMHG | SYSTOLIC BLOOD PRESSURE: 121 MMHG | HEART RATE: 98 BPM

## 2024-07-12 DIAGNOSIS — E11.00 TYPE 2 DIABETES MELLITUS WITH HYPEROSMOLARITY WITHOUT COMA, WITHOUT LONG-TERM CURRENT USE OF INSULIN (H): ICD-10-CM

## 2024-07-12 DIAGNOSIS — I26.92 ACUTE SADDLE PULMONARY EMBOLISM WITHOUT ACUTE COR PULMONALE (H): ICD-10-CM

## 2024-07-12 DIAGNOSIS — E66.01 MORBID OBESITY (H): ICD-10-CM

## 2024-07-12 DIAGNOSIS — R32 URINARY INCONTINENCE, UNSPECIFIED TYPE: ICD-10-CM

## 2024-07-12 DIAGNOSIS — I48.91 ATRIAL FIBRILLATION, UNSPECIFIED TYPE (H): ICD-10-CM

## 2024-07-12 DIAGNOSIS — Z09 HOSPITAL DISCHARGE FOLLOW-UP: Primary | ICD-10-CM

## 2024-07-12 PROCEDURE — 99214 OFFICE O/P EST MOD 30 MIN: CPT | Performed by: FAMILY MEDICINE

## 2024-07-12 NOTE — PROGRESS NOTES
Assessment & Plan     Hospital discharge follow-up  Acute saddle pulmonary embolism without acute cor pulmonale (H)  Patient with history of arterial fibrillation, he was admitted to the hospital for symptomatic syncopal episode.  Patient was not taking his Xarelto at that time.  He reports he was laid off his job as a .  His initial workup, stroke workup was negative.  He had brain MRI, head CT scan, CT angiogram all these tests were normal.    Chest CT scan showed diffuse bilaterally pulmonary embolisms.    Since discharge he has been doing well, no chest pain, no short of breath.  He is back on his Xarelto.    I reviewed his admission, discharge summary, his workup.      Atrial fibrillation, unspecified type (H)  Continue with current medications  Xarelto, and metoprolol.    Type 2 diabetes mellitus with hyperosmolarity without coma, without long-term current use of insulin (H)    - semaglutide (OZEMPIC) 2 MG/3ML pen; Inject 0.25 mg subcutaneously every 7 days  - semaglutide (OZEMPIC) 2 MG/3ML pen; Inject 0.5 mg subcutaneously every 7 days    Morbid obesity (H)  Discussed diet and weight  He is continue to lose weight  Has lost almost 40 ib, since beginning of this year.    - semaglutide (OZEMPIC) 2 MG/3ML pen; Inject 0.25 mg subcutaneously every 7 days  - semaglutide (OZEMPIC) 2 MG/3ML pen; Inject 0.5 mg subcutaneously every 7 days    Urinary incontinence, unspecified type  Use Codon catheter  Follow up with Urology.      MED REC REQUIRED  Post Medication Reconciliation Status: discharge medications reconciled, continue medications without change    Work on weight loss  Regular exercise    Karrie Woody is a 68 year old, presenting for the following health issues:  Hospital F/U        7/12/2024     9:00 AM   Additional Questions   Roomed by quintin nation         Hospital Follow-up Visit:    Hospital/Nursing Home/IP Rehab Facility:  [facility]  Date of Admission: 7/3/24  Date of  "Discharge: 7/5/24  Reason(s) for Admission: syncope and collapse  Was the patient in the ICU or did the patient experience delirium during hospitalization?  No  Do you have any other stressors you would like to discuss with your provider? No    Problems taking medications regularly:  None  Medication changes since discharge: Xarelto  Problems adhering to non-medication therapy:  None    Summary of hospitalization:  CareEverywhere information obtained and reviewed  Diagnostic Tests/Treatments reviewed.  Follow up needed: none  Other Healthcare Providers Involved in Patient s Care:         None  Update since discharge: improved.         Plan of care communicated with patient             Review of Systems  Constitutional, HEENT, cardiovascular, pulmonary, GI, , musculoskeletal, neuro, skin, endocrine and psych systems are negative, except as otherwise noted.      Objective    /84 (BP Location: Right arm, Patient Position: Chair, Cuff Size: Adult Large)   Pulse 98   Temp 97.9  F (36.6  C) (Oral)   Resp 24   Ht 1.905 m (6' 3\")   Wt (!) 164.3 kg (362 lb 3.2 oz)   SpO2 98%   BMI 45.27 kg/m    Body mass index is 45.27 kg/m .  Physical Exam   GENERAL: alert and no distress  NECK: no adenopathy, no asymmetry, masses, or scars  RESP: lungs clear to auscultation - no rales, rhonchi or wheezes  CV: regular rate and rhythm, normal S1 S2, no S3 or S4, no murmur, click or rub, no peripheral edema  ABDOMEN: soft, nontender, no hepatosplenomegaly, no masses and bowel sounds normal  MS: no gross musculoskeletal defects noted, no edema  SKIN: no suspicious lesions or rashes  NEURO: Normal strength and tone, mentation intact and speech normal  PSYCH: mentation appears normal, affect normal/bright    Lab Results   Component Value Date    A1C 6.7 06/18/2024    A1C 7.4 04/04/2023    A1C 6.3 12/19/2019             Signed Electronically by: Henrique Hartmna MD    "

## 2024-07-12 NOTE — PROGRESS NOTES
Clinical Product Navigator RN reviewed chart; patient on payer product coverage.  Review results:   CPN Initial Information Gathering  Referral Source: Health Plan    Obtaining further information to determine needs and next steps.  Patient was identified by his health plan for RN Clinical Product Navigator review.  Patient was inpatient at [facility] 7/3/24-7/5/24 for acute pulmonary embolism, patient had not been taking Xarelto.  Patient had follow-up with PCP today and was advised to follow-up in 4 months, patient was back to taking his Xarelto and improved.  Patient is also active with his care team via [employer].  No additional needs identified, therefore, no further action needed by RN Clinical Product Navigator at this time.    Melissa Behl BSN, RN, PHN, CCM  RN Clinical Product Navigator  [phone]

## 2024-07-26 ENCOUNTER — MYC REFILL (OUTPATIENT)
Dept: CARDIOLOGY | Facility: CLINIC | Age: 69
End: 2024-07-26
Payer: COMMERCIAL

## 2024-07-26 ENCOUNTER — MYC MEDICAL ADVICE (OUTPATIENT)
Dept: CARDIOLOGY | Facility: CLINIC | Age: 69
End: 2024-07-26
Payer: COMMERCIAL

## 2024-07-26 ENCOUNTER — TELEPHONE (OUTPATIENT)
Dept: FAMILY MEDICINE | Facility: CLINIC | Age: 69
End: 2024-07-26
Payer: COMMERCIAL

## 2024-07-26 DIAGNOSIS — I48.91 ATRIAL FIBRILLATION, UNSPECIFIED TYPE (H): ICD-10-CM

## 2024-07-26 NOTE — TELEPHONE ENCOUNTER
Medication Question        What medication are you calling about (include dose and sig)?: Neurontin. Patient wants to know why Dr. Hartman discontinued this medication    Preferred Pharmacy:   [facility] [id number] [employer] - [place]RIGUEZ - [address]  [address]  [place] [address]  Phone: [phone] Fax: [phone]        Controlled Substance Agreement on file:   CSA -- Patient Level:    CSA: None found at the patient level.       Who prescribed the medication?: [facility]    Do you need a refill? Has question why Neurontin is crossed off his list     When did you use the medication last? Unknow    Patient offered an appointment? no    Do you have any questions or concerns?  no      Could we send this information to you in [facility] or would you prefer to receive a phone call?:   Patient would prefer a phone call   Okay to leave a detailed message?: Yes at Cell number on file:    Telephone Information:   Mobile [phone]

## 2024-07-29 DIAGNOSIS — G57.92 NEUROPATHY OF LEFT LOWER EXTREMITY: ICD-10-CM

## 2024-07-29 RX ORDER — GABAPENTIN 300 MG/1
300 CAPSULE ORAL
Qty: 90 CAPSULE | Refills: 5 | Status: SHIPPED | OUTPATIENT
Start: 2024-07-29

## 2024-07-29 NOTE — TELEPHONE ENCOUNTER
It was decreased to one tab at bedtime,   To reduce increase swelling in his lower extremities, ( it causes lower ext fluid retention )    On his last visit , 07/ 12/2024, he reported was not taken , therefore it was discontinued    I did sent a new refill, he may take one tab at bedtime as needed  Otherwise, no need to take    thanks

## 2024-07-29 NOTE — TELEPHONE ENCOUNTER
Dr. Hartman,    I attempted to read through visit notes on 7/12/24, however, unable to determine why Neurontin was discontinued. Please advise. If pt is to still take please put on order so he can  from pharm.       Thanks,  JONAS Mantilla  [facility]

## 2024-07-30 NOTE — TELEPHONE ENCOUNTER
rivaroxaban ANTICOAGULANT (XARELTO) 20 MG TABS tablet   Disp-90 tablet, R-3    Start: 04/29/2024     Remaining refills sent to requested pharmacy. Per WO per my chart request

## 2024-08-01 NOTE — TELEPHONE ENCOUNTER
Patient verbalized understanding of below message.    Edwin Portillo, RN  Triage Nurse  [facility], [facility]

## 2024-08-21 ENCOUNTER — TELEPHONE (OUTPATIENT)
Dept: FAMILY MEDICINE | Facility: CLINIC | Age: 69
End: 2024-08-21
Payer: COMMERCIAL

## 2024-08-21 NOTE — TELEPHONE ENCOUNTER
Patient started ozempic 0.25 mg on 8/12/24  He keeps getting notice from the pharmacy that another ozempic prescription is ready for   He noted on mychart that he has 2 ozempic prescriptions listed.  One for 0.25 mg and another for 0.5 mg   He is wondering if he is supposed to be on both doses.  Explained that he should finish the 0.25 mg dose then titrate upwards as long as he is tolerating med well.  Explained that this med is typically titrated up monthly til he is up to the maintenance dose.   Patient verbalized understanding.    Dayana Black RN  [facility]

## 2024-09-20 ENCOUNTER — TELEPHONE (OUTPATIENT)
Dept: FAMILY MEDICINE | Facility: CLINIC | Age: 69
End: 2024-09-20
Payer: COMMERCIAL

## 2024-09-20 DIAGNOSIS — I10 ESSENTIAL HYPERTENSION: ICD-10-CM

## 2024-09-20 DIAGNOSIS — E78.5 DYSLIPIDEMIA: ICD-10-CM

## 2024-09-20 RX ORDER — ATORVASTATIN CALCIUM 40 MG/1
40 TABLET, FILM COATED ORAL AT BEDTIME
Qty: 100 TABLET | Refills: 2 | Status: SHIPPED | OUTPATIENT
Start: 2024-09-20

## 2024-09-20 RX ORDER — LISINOPRIL 20 MG/1
20 TABLET ORAL DAILY
Qty: 100 TABLET | Refills: 2 | Status: SHIPPED | OUTPATIENT
Start: 2024-09-20

## 2024-09-20 NOTE — TELEPHONE ENCOUNTER
Patient is overdue to fill atorvastatin and lisinopril. Per our records, last filled 5/29/24 for 100 day supply and is 14 days late to fill. Patient says he has a few left, but will  soon. Requested prescription refill be sent to [facility]. Sent per protocol.     Monica Flower, PharmD, [facility]  Population Health Pharmacist  [phone]

## 2024-10-01 ENCOUNTER — PATIENT OUTREACH (OUTPATIENT)
Dept: CARE COORDINATION | Facility: CLINIC | Age: 69
End: 2024-10-01
Payer: COMMERCIAL

## 2024-11-02 ENCOUNTER — HEALTH MAINTENANCE LETTER (OUTPATIENT)
Age: 69
End: 2024-11-02

## 2024-11-22 ENCOUNTER — TRANSFERRED RECORDS (OUTPATIENT)
Dept: MULTI SPECIALTY CLINIC | Facility: CLINIC | Age: 69
End: 2024-11-22

## 2024-11-22 LAB — RETINOPATHY: NORMAL

## 2024-12-16 ENCOUNTER — PATIENT OUTREACH (OUTPATIENT)
Dept: CARE COORDINATION | Facility: CLINIC | Age: 69
End: 2024-12-16
Payer: COMMERCIAL

## 2025-01-02 ENCOUNTER — TELEPHONE (OUTPATIENT)
Dept: FAMILY MEDICINE | Facility: CLINIC | Age: 70
End: 2025-01-02
Payer: COMMERCIAL

## 2025-01-02 NOTE — TELEPHONE ENCOUNTER
Ryder Wylie MD  [facility] team,    Please reschedule patient for annual wellness appt. Preferably in person as patient was unable to connect to virtual appt due to technical difficulties on his personal device.    Thank you!  BESSIE Wlyie

## 2025-01-02 NOTE — TELEPHONE ENCOUNTER
Called patient and left a detailed voicemail message that I was calling to help him schedule his wellness exam.    OSMEL Cole  [facility]

## 2025-01-21 SDOH — HEALTH STABILITY: PHYSICAL HEALTH: ON AVERAGE, HOW MANY DAYS PER WEEK DO YOU ENGAGE IN MODERATE TO STRENUOUS EXERCISE (LIKE A BRISK WALK)?: 0 DAYS

## 2025-01-21 SDOH — HEALTH STABILITY: PHYSICAL HEALTH: ON AVERAGE, HOW MANY MINUTES DO YOU ENGAGE IN EXERCISE AT THIS LEVEL?: 0 MIN

## 2025-01-21 ASSESSMENT — SOCIAL DETERMINANTS OF HEALTH (SDOH): HOW OFTEN DO YOU GET TOGETHER WITH FRIENDS OR RELATIVES?: MORE THAN THREE TIMES A WEEK

## 2025-01-23 ENCOUNTER — OFFICE VISIT (OUTPATIENT)
Dept: FAMILY MEDICINE | Facility: CLINIC | Age: 70
End: 2025-01-23
Payer: COMMERCIAL

## 2025-01-23 VITALS
BODY MASS INDEX: 39.17 KG/M2 | DIASTOLIC BLOOD PRESSURE: 92 MMHG | SYSTOLIC BLOOD PRESSURE: 142 MMHG | HEIGHT: 75 IN | WEIGHT: 315 LBS | HEART RATE: 90 BPM | TEMPERATURE: 97.9 F | OXYGEN SATURATION: 98 % | RESPIRATION RATE: 20 BRPM

## 2025-01-23 DIAGNOSIS — Z79.01 CURRENT USE OF LONG TERM ANTICOAGULATION: ICD-10-CM

## 2025-01-23 DIAGNOSIS — I42.0 DILATED CARDIOMYOPATHY (H): ICD-10-CM

## 2025-01-23 DIAGNOSIS — F33.0 MILD RECURRENT MAJOR DEPRESSION: ICD-10-CM

## 2025-01-23 DIAGNOSIS — E11.00 TYPE 2 DIABETES MELLITUS WITH HYPEROSMOLARITY WITHOUT COMA, WITHOUT LONG-TERM CURRENT USE OF INSULIN (H): ICD-10-CM

## 2025-01-23 DIAGNOSIS — Z86.711 HISTORY OF PULMONARY EMBOLISM: ICD-10-CM

## 2025-01-23 DIAGNOSIS — E11.42 DIABETIC POLYNEUROPATHY ASSOCIATED WITH TYPE 2 DIABETES MELLITUS (H): ICD-10-CM

## 2025-01-23 DIAGNOSIS — I10 PRIMARY HYPERTENSION: ICD-10-CM

## 2025-01-23 DIAGNOSIS — I48.91 ATRIAL FIBRILLATION, UNSPECIFIED TYPE (H): ICD-10-CM

## 2025-01-23 DIAGNOSIS — Z00.00 ENCOUNTER FOR MEDICARE ANNUAL WELLNESS EXAM: Primary | ICD-10-CM

## 2025-01-23 PROBLEM — F43.21 ADJUSTMENT DISORDER WITH DEPRESSED MOOD: Status: ACTIVE | Noted: 2024-07-04

## 2025-01-23 PROBLEM — N17.9 AKI (ACUTE KIDNEY INJURY): Status: RESOLVED | Noted: 2024-07-04 | Resolved: 2025-01-23

## 2025-01-23 PROBLEM — R55 SYNCOPE: Status: RESOLVED | Noted: 2024-07-04 | Resolved: 2025-01-23

## 2025-01-23 LAB
EST. AVERAGE GLUCOSE BLD GHB EST-MCNC: 146 MG/DL
HBA1C MFR BLD: 6.7 % (ref 0–5.6)

## 2025-01-23 RX ORDER — GABAPENTIN 300 MG/1
600 CAPSULE ORAL
Qty: 90 CAPSULE | Refills: 5 | Status: SHIPPED | OUTPATIENT
Start: 2025-01-23

## 2025-01-23 ASSESSMENT — PAIN SCALES - GENERAL: PAINLEVEL_OUTOF10: NO PAIN (0)

## 2025-01-23 ASSESSMENT — PATIENT HEALTH QUESTIONNAIRE - PHQ9
SUM OF ALL RESPONSES TO PHQ QUESTIONS 1-9: 8
10. IF YOU CHECKED OFF ANY PROBLEMS, HOW DIFFICULT HAVE THESE PROBLEMS MADE IT FOR YOU TO DO YOUR WORK, TAKE CARE OF THINGS AT HOME, OR GET ALONG WITH OTHER PEOPLE: NOT DIFFICULT AT ALL
SUM OF ALL RESPONSES TO PHQ QUESTIONS 1-9: 8

## 2025-01-23 NOTE — PROGRESS NOTES
Preventive Care Visit  [facility]  Ryder Wylie MD, Family Medicine  Jan 23, 2025      Assessment & Plan     Encounter for Medicare annual wellness exam  - REVIEW OF HEALTH MAINTENANCE PROTOCOL ORDERS  - PRIMARY CARE FOLLOW-UP SCHEDULING; Future    Type 2 diabetes mellitus with hyperosmolarity without coma, without long-term current use of insulin (H)  Controlled on metformin and Ozempic, A1c of 6.7 today.  Completed eye exam in 9/2024  Taking ACEi, statin therapy  Continue current management   Needs to schedule DM and MTM follow-up   After shared decision making, patient agreeable with plan   - Med Therapy Management Referral  - Hemoglobin A1c; Future  - Hemoglobin A1c    Diabetic polyneuropathy associated with type 2 diabetes mellitus (H)  Pain is not controlled with gabapentin 300 mg nightly.   Recommend the following:   Continue maintaining control of diabetes   Increase gabapentin to 600 mg nightly  Consider trial of capsaicin cream when certain insurance will cover new medicine  Follow-up if neuropathy does not improve   After shared decision making, patient agreeable with plan   - gabapentin (NEURONTIN) 300 MG capsule; Take 2 capsules (600 mg) by mouth nightly as needed for neuropathic pain (as needed).    Atrial fibrillation, unspecified type (H)  Stable on metoprolol succinate and anticoagulation.   No acute concerns. VSS on exam today.   Follows with cardiology.    Current use of long term anticoagulation  History of pulmonary embolism   Stable on Xarelto. No acute concerns today.  Follows with cardiology  - Med Therapy Management Referral    Dilated cardiomyopathy (H)  Stable per most recent echo in 4/2024.  Patient remains asymptomatic.   Follows with cardiology    Primary hypertension  Reasonably controlled at 140/90 today. Did not have time to recheck per patient.   Taking lisinopril and metoprolol. Does not check BP at home.  Counseled lifestyle factors  Continue current  "management     Mild recurrent major depression  PHQ9 of 8 today. Patient has struggled in the past after death of wife and losing his job.   Learning to cope better and has strong family support at home with adult children.   Offered counseling and pharmacotherapy  After shared decision making, patient declined for now    Body mass index (BMI) 50.0-59.9, adult (H)  Estimated body mass index is 47.25 kg/m  as calculated from the following:    Height as of this encounter: 1.905 m (6' 3\").    Weight as of this encounter: 171.5 kg (378 lb).   Weight management plan: Discussed healthy diet and exercise guidelines    Counseling  Appropriate preventive services were addressed with this patient via screening, questionnaire, or discussion as appropriate for fall prevention, nutrition, physical activity, Tobacco-use cessation, social engagement, weight loss and cognition.  Checklist reviewing preventive services available has been given to the patient.  Reviewed patient's diet, addressing concerns and/or questions.   The patient was instructed to see the dentist every 6 months.   He is at risk for psychosocial distress and has been provided with information to reduce risk.   Discussed possible causes of fatigue. Updated plan of care.  Patient reported difficulty with activities of daily living were addressed today.Patient reported safety concerns were addressed today.The patient was provided with written information regarding signs of hearing loss.   Information on urinary incontinence and treatment options given to patient.   The patient's PHQ-9 score is consistent with mild depression. He was provided with information regarding depression.     All relevant questions and concerns have been addressed.   The patient expressed understanding and is agreeable with the above plan as discussed.   AVS provided to patient during office visit via hardcopy and/or MyChart.    Follow-up: Return in about 3 months (around 4/23/2025) for " diabetes.    Patient has been advised of split billing requirements and indicates understanding: Yes    65 minutes spent by me on the date of the encounter doing chart review, history and exam, documentation and further activities per the note  Ordering of each unique test  Prescription drug management    Ryder Wylie MD 1/23/2025 1:56 PM    Note to patient: The 21st Century Cures Act makes medical notes like this available to patients in the interest of transparency. However, be advised this is a medical document. It is intended as [id number] communication. It is written in medical language and may contain abbreviations or verbiage that are unfamiliar. It may appear blunt or direct. Medical documents are intended to carry relevant information, facts as evident, and the clinical opinion of the practitioner.          Karrie Woody is a 69 year old, presenting for the following:  Physical        1/23/2025     1:23 PM   Additional Questions   Roomed by nik frederick ma   Accompanied by self         1/23/2025     1:23 PM   Patient Reported Additional Medications   Patient reports taking the following new medications none           HPI    Afib on anticoagulation  Taking Xarelto without issue  Doing well, denies any symptoms  Sees cardiology     Dilated cardiomyopathy   Patient reports diagnosis after flu vaccine  Last echo was normal    HTN  No concerns  Taking meds as instructed  Normal BP today    Depression  Patient has struggled in the past after death of wife and losing his job.   Learning to cope better and has strong family support at home with adult children.  Does not want to start medication  Counseling has not worked in the past  Managing okay right now  Denies SI    Today's PHQ-9 Score:       1/23/2025     1:22 PM   PHQ-9 SCORE   PHQ-9 Total Score MyChart 8 (Mild depression)   PHQ-9 Total Score 8        Patient-reported       Diabetes Follow-up  How often are you checking your blood sugar? Patient  does not check, he just counts carbs and watches what he eats. He was told that if he is not using insulin, then he did not need to check his blood sugar.  What concerns do you have today about your diabetes? None  Do you have any of these symptoms? (Select all that apply)  Numbness in feet  Peripheral neuropathy since diagnosis, painful and slowly progressing, both feet   Gabapentin dose not helping at night      BP Readings from Last 2 Encounters:   01/23/25 (!) 142/92   07/12/24 121/84     Hemoglobin A1C (%)   Date Value   01/23/2025 6.7 (H)   06/18/2024 6.7 (H)   12/19/2019 6.3 (H)     LDL Cholesterol Calculated (mg/dL)   Date Value   06/18/2024 107 (H)   04/04/2023 131 (H)   12/19/2019 109 (H)   12/01/2011 146 (H)             Health Care Directive  Patient does not have a Health Care Directive: Patient states has Advance Directive and will bring in a copy to clinic.      1/21/2025   General Health   How would you rate your overall physical health? Good   Feel stress (tense, anxious, or unable to sleep) To some extent   (!) STRESS CONCERN      1/21/2025   Nutrition   Diet: Low salt    Low fat/cholesterol    Diabetic    Carbohydrate counting       Multiple values from one day are sorted in reverse-chronological order         1/21/2025   Exercise   Days per week of moderate/strenous exercise 0 days   Average minutes spent exercising at this level 0 min   (!) EXERCISE CONCERN      1/21/2025   Social Factors   Frequency of gathering with friends or relatives More than three times a week   Worry food won't last until get money to buy more No   Food not last or not have enough money for food? No   Do you have housing? (Housing is defined as stable permanent housing and does not include staying ouside in a car, in a tent, in an abandoned building, in an overnight shelter, or couch-surfing.) Yes   Are you worried about losing your housing? No   Lack of transportation? No   Unable to get utilities (heat,electricity)? No          1/23/2025   Fall Risk   Gait Speed Test (Document in seconds) 7.57    7.57   Gait Speed Test Interpretation Greater than 5.01 seconds - ABNORMAL    Greater than 5.01 seconds - ABNORMAL       Multiple values from one day are sorted in reverse-chronological order          1/21/2025   Activities of Daily Living- Home Safety   Needs help with the following daily activites Preparing meals    Housework    Laundry   Safety concerns in the home No grab bars in the bathroom       Multiple values from one day are sorted in reverse-chronological order         1/21/2025   Dental   Dentist two times every year? (!) NO         1/21/2025   Hearing Screening   Hearing concerns? (!) I NEED TO ASK PEOPLE TO SPEAK UP OR REPEAT THEMSELVES.    (!) IT'S HARD TO FOLLOW A CONVERSATION IN A NOISY RESTAURANT OR CROWDED ROOM.       Multiple values from one day are sorted in reverse-chronological order         1/21/2025   Driving Risk Screening   Patient/family members have concerns about driving No         1/21/2025   General Alertness/Fatigue Screening   Have you been more tired than usual lately? (!) YES         1/21/2025   Urinary Incontinence Screening   Bothered by leaking urine in past 6 months Yes         12/31/2024   TB Screening   Were you born outside of the [place]? No       Today's PHQ-9 Score:       1/23/2025     1:22 PM   PHQ-9 SCORE   PHQ-9 Total Score MyChart 8 (Mild depression)   PHQ-9 Total Score 8        Patient-reported         1/21/2025   Substance Use   Alcohol more than 3/day or more than 7/wk No   Do you have a current opioid prescription? No   How severe/bad is pain from 1 to 10? 5/10   Do you use any other substances recreationally? No     Social History     Tobacco Use    Smoking status: Never     Passive exposure: Never    Smokeless tobacco: Never   Vaping Use    Vaping status: Never Used   Substance Use Topics    Alcohol use: Not Currently     Comment: occassionally    Drug use: Never           1/21/2025   AAA  Screening   Family history of Abdominal Aortic Aneurysm (AAA)? No     ASCVD Risk   The 10-year ASCVD risk score (Sherita CARSON, et al., 2019) is: 47%    Values used to calculate the score:      Age: 69 years      Sex: Male      Is Non- : No      Diabetic: Yes      Tobacco smoker: No      Systolic Blood Pressure: 142 mmHg      Is BP treated: Yes      HDL Cholesterol: 30 mg/dL      Total Cholesterol: 169 mg/dL        Reviewed and updated as needed this visit by Provider   Tobacco  Allergies  Meds  Problems  Med Hx  Surg Hx  Fam Hx          Current providers sharing in care for this patient include:  Patient Care Team:  Henrique Hartman MD as PCP - General (Family Medicine)  Henrique Hartman MD as Assigned PCP  Sherrell Fields PA-C as Physician Assistant (Urology)  Sherrell Fields PA-C as Assigned Surgical Provider  Lenora Wheeler RD as Diabetes Educator (Dietitian)  Sandy Medina MD as MD (Cardiovascular Disease)  Sandy Medina MD as Assigned Heart and Vascular Provider    The following health maintenance items are reviewed in Epic and correct as of today:  Health Maintenance   Topic Date Due    RSV VACCINE (1 - Risk 60-74 years 1-dose series) Never done    Medicare Annual MTM Pharmacist Visit (once per calendar year)  Never done    DIABETIC FOOT EXAM  02/06/2025    A1C  04/23/2025    BMP  06/18/2025    LIPID  06/18/2025    MICROALBUMIN  06/18/2025    PHQ-9  07/23/2025    EYE EXAM  11/22/2025    MEDICARE ANNUAL WELLNESS VISIT  01/23/2026    ANNUAL REVIEW OF HM ORDERS  01/23/2026    FALL RISK ASSESSMENT  01/23/2026    COLORECTAL CANCER SCREENING  05/07/2026    ADVANCE CARE PLANNING  01/23/2030    DTAP/TDAP/TD IMMUNIZATION (4 - Td or Tdap) 06/18/2034    HEPATITIS C SCREENING  Completed    DEPRESSION ACTION PLAN  Completed    Pneumococcal Vaccine: 50+ Years  Completed    ZOSTER IMMUNIZATION  Completed    HPV IMMUNIZATION  Aged Out    MENINGITIS IMMUNIZATION  Aged Out    RSV  "MONOCLONAL ANTIBODY  Aged Out    INFLUENZA VACCINE  Discontinued    COVID-19 Vaccine  Discontinued       Review of Systems: Please refer to HPI for pertinent positives and negatives.          Objective    Exam  BP (!) 142/92 (BP Location: Right arm, Patient Position: Sitting, Cuff Size: Adult Large)   Pulse 90   Temp 97.9  F (36.6  C) (Tympanic)   Resp 20   Ht 1.905 m (6' 3\")   Wt (!) 171.5 kg (378 lb)   SpO2 98%   BMI 47.25 kg/m     Estimated body mass index is 47.25 kg/m  as calculated from the following:    Height as of this encounter: 1.905 m (6' 3\").    Weight as of this encounter: 171.5 kg (378 lb).    Physical Exam  Vitals reviewed.   Constitutional:       General: He is not in acute distress.     Appearance: Normal appearance. He is not ill-appearing or diaphoretic.      Comments: Very pleasant patient.    HENT:      Head: Normocephalic and atraumatic.      Nose: Nose normal.      Mouth/Throat:      Mouth: Mucous membranes are moist.      Pharynx: Oropharynx is clear.   Eyes:      Extraocular Movements: Extraocular movements intact.      Conjunctiva/sclera: Conjunctivae normal.      Pupils: Pupils are equal, round, and reactive to light.   Cardiovascular:      Rate and Rhythm: Normal rate and regular rhythm.      Heart sounds: Normal heart sounds.   Pulmonary:      Effort: Pulmonary effort is normal. No respiratory distress.      Breath sounds: Normal breath sounds.   Musculoskeletal:         General: No swelling.      Comments: Cane at bedside.    Skin:     General: Skin is warm and dry.   Neurological:      Mental Status: He is alert.   Psychiatric:         Mood and Affect: Mood normal.         Behavior: Behavior normal.                1/23/2025   Mini Cog   Clock Draw Score 2 Normal   3 Item Recall 3 objects recalled   Mini Cog Total Score 5             12/31/2024   Vision Screen   Reason Vision Screen Not Completed Screening Recommend: Patient/Guardian Declined       Signed Electronically by: " Ryder Wylie MD    .undefined[^^  Answers submitted by the patient for this visit:  Patient Health Questionnaire (Submitted on 1/23/2025)  If you checked off any problems, how difficult have these problems made it for you to do your work, take care of things at home, or get along with other people?: Not difficult at all  PHQ9 TOTAL SCORE: 8

## 2025-01-23 NOTE — PATIENT INSTRUCTIONS
Bong,    Thank you for allowing [facility] to manage your care today.    Our plan is as follows:    I sent your prescriptions to your pharmacy.  Start taking gabapentin 600 mg nightly to help with neuropathy   Follow-up if pain persists     Schedule follow-up for diabetes review.     If you have any questions or concerns, please use SnapUp message and/or feel free to call us at [phone].    Your partner in health,    Dr. Wylie      Did you know?    You can schedule a video visit for follow-up appointments as well as future appointments for certain conditions. Please see the below link.     [email or web address]    If you have not already done so, I encourage you to sign up for [employer] ([email or web address]). This will allow you to review your results, securely communicate with a provider, and schedule virtual visits as well.      Patient Education   Preventive Care Advice   This is general advice given by our system to help you stay healthy. However, your care team may have specific advice just for you. Please talk to your care team about your preventive care needs.  Nutrition  Eat 5 or more servings of fruits and vegetables each day.  Try wheat bread, brown rice and whole grain pasta (instead of white bread, rice, and pasta).  Get enough calcium and vitamin D. Check the label on foods and aim for 100% of the RDA (recommended daily allowance).  Lifestyle  Exercise at least 150 minutes each week  (30 minutes a day, 5 days a week).  Do muscle strengthening activities 2 days a week. These help control your weight and prevent disease.  No smoking.  Wear sunscreen to prevent skin cancer.  Have a dental exam and cleaning every 6 months.  Yearly exams  See your health care team every year to talk about:  Any changes in your health.  Any medicines your care team has prescribed.  Preventive care, family planning, and ways to prevent chronic diseases.  Shots  (vaccines)   HPV shots (up to age 26), if you've never had them before.  Hepatitis B shots (up to age 59), if you've never had them before.  COVID-19 shot: Get this shot when it's due.  Flu shot: Get a flu shot every year.  Tetanus shot: Get a tetanus shot every 10 years.  Pneumococcal, hepatitis A, and RSV shots: Ask your care team if you need these based on your risk.  Shingles shot (for age 50 and up)  General health tests  Diabetes screening:  Starting at age 35, Get screened for diabetes at least every 3 years.  If you are younger than age 35, ask your care team if you should be screened for diabetes.  Cholesterol test: At age 39, start having a cholesterol test every 5 years, or more often if advised.  Bone density scan (DEXA): At age 50, ask your care team if you should have this scan for osteoporosis (brittle bones).  Hepatitis C: Get tested at least once in your life.  STIs (sexually transmitted infections)  Before age 24: Ask your care team if you should be screened for STIs.  After age 24: Get screened for STIs if you're at risk. You are at risk for STIs (including HIV) if:  You are sexually active with more than one person.  You don't use condoms every time.  You or a partner was diagnosed with a sexually transmitted infection.  If you are at risk for HIV, ask about PrEP medicine to prevent HIV.  Get tested for HIV at least once in your life, whether you are at risk for HIV or not.  Cancer screening tests  Cervical cancer screening: If you have a cervix, begin getting regular cervical cancer screening tests starting at age 21.  Breast cancer scan (mammogram): If you've ever had breasts, begin having regular mammograms starting at age 40. This is a scan to check for breast cancer.  Colon cancer screening: It is important to start screening for colon cancer at age 45.  Have a colonoscopy test every 10 years (or more often if you're at risk) Or, ask your provider about stool tests like a FIT test every year  or Cologuard test every 3 years.  To learn more about your testing options, visit:   .  For help making a decision, visit:   https://bit.ly/cw74123.  Prostate cancer screening test: If you have a prostate, ask your care team if a prostate cancer screening test (PSA) at age 55 is right for you.  Lung cancer screening: If you are a current or former smoker ages 50 to 80, ask your care team if ongoing lung cancer screenings are right for you.  For informational purposes only. Not to replace the advice of your health care provider. Copyright   2023 [employer]. All rights reserved. Clinically reviewed by the [facility] Transitions Program. [employer] 619300 - REV 01/24.  Learning About Activities of Daily Living  What are activities of daily living?     Activities of daily living (ADLs) are the basic self-care tasks you do every day. These include eating, bathing, dressing, and moving around.  As you age, and if you have health problems, you may find that it's harder to do some of these tasks. If so, your doctor can suggest ideas that may help.  To measure what kind of help you may need, your doctor will ask how well you are able to do ADLs. Let your doctor know if there are any tasks that you are having trouble doing. This is an important first step to getting help. And when you have the help you need, you can stay as independent as possible.  How will a doctor assess your ADLs?  Asking about ADLs is part of a routine health checkup your doctor will likely do as you age. Your health check might be done in a doctor's office, in your home, or at a hospital. The goal is to find out if you are having any problems that could make it hard to care for yourself or that make it unsafe for you to be on your own.  To measure your ADLs, your doctor will ask how hard it is for you to do routine tasks. Your doctor may also want to know if you have changed the way you do a task because of a health problem. Your  doctor may watch how you:  Walk back and forth.  Keep your balance while you stand or walk.  Move from sitting to standing or from a bed to a chair.  Button or unbutton a shirt or sweater.  Remove and put on your shoes.  It's common to feel a little worried or anxious if you find you can't do all the things you used to be able to do. Talking with your doctor about ADLs is a way to make sure you're as safe as possible and able to care for yourself as well as you can. You may want to bring a caregiver, friend, or family member to your checkup. They can help you talk to your doctor.  Follow-up care is a key part of your treatment and safety. Be sure to make and go to all appointments, and call your doctor if you are having problems. It's also a good idea to know your test results and keep a list of the medicines you take.  Current as of: October 24, 2023  Content Version: 14.3    2024 [employer].   Care instructions adapted under license by your healthcare professional. If you have questions about a medical condition or this instruction, always ask your healthcare professional. [employer] disclaims any warranty or liability for your use of this information.    Preventing Falls: Care Instructions  Injuries and health problems such as trouble walking or poor eyesight can increase your risk of falling. So can some medicines. But there are things you can do to help prevent falls. You can exercise to get stronger. You can also arrange your home to make it safer.    Talk to your doctor about the medicines you take. Ask if any of them increase the risk of falls and whether they can be changed or stopped.   Try to exercise regularly. It can help improve your strength and balance. This can help lower your risk of falling.         Practice fall safety and prevention.   Wear low-heeled shoes that fit well and give your feet good support. Talk to your doctor if you have foot problems that make this  "hard.  Carry a cellphone or wear a medical alert device that you can use to call for help.  Use stepladders instead of chairs to reach high objects. Don't climb if you're at risk for falls. Ask for help, if needed.  Wear the correct eyeglasses, if you need them.        Make your home safer.   Remove rugs, cords, clutter, and furniture from walkways.  Keep your house well lit. Use night-lights in hallways and bathrooms.  Install and use sturdy handrails on stairways.  Wear nonskid footwear, even inside. Don't walk barefoot or in socks without shoes.        Be safe outside.   Use handrails, curb cuts, and ramps whenever possible.  Keep your hands free by using a shoulder bag or backpack.  Try to walk in well-lit areas. Watch out for uneven ground, changes in pavement, and debris.  Be careful in the winter. Walk on the grass or gravel when sidewalks are slippery. Use de-icer on steps and walkways. Add non-slip devices to shoes.    Put grab bars and nonskid mats in your shower or tub and near the toilet. Try to use a shower chair or bath bench when bathing.   Get into a tub or shower by putting in your weaker leg first. Get out with your strong side first. Have a phone or medical alert device in the bathroom with you.   Where can you learn more?  Go to https://www.[employer].net/patiented  Enter G117 in the search box to learn more about \"Preventing Falls: Care Instructions.\"  Current as of: July 31, 2024  Content Version: 14.3    2024 [employer].   Care instructions adapted under license by your healthcare professional. If you have questions about a medical condition or this instruction, always ask your healthcare professional. [employer] disclaims any warranty or liability for your use of this information.    Hearing Loss: Care Instructions  Overview     Hearing loss is a sudden or slow decrease in how well you hear. It can range from slight to profound. Permanent hearing loss can occur with " aging. It also can happen when you are exposed long-term to loud noise. Examples include listening to loud music, riding motorcycles, or being around other loud machines.  Hearing loss can affect your work and home life. It can make you feel lonely or depressed. You may feel that you have lost your independence. But hearing aids and other devices can help you hear better and feel connected to others.  Follow-up care is a key part of your treatment and safety. Be sure to make and go to all appointments, and call your doctor if you are having problems. It's also a good idea to know your test results and keep a list of the medicines you take.  How can you care for yourself at home?  Avoid loud noises whenever possible. This helps keep your hearing from getting worse.  Always wear hearing protection around loud noises.  Wear a hearing aid as directed.  A professional can help you pick a hearing aid that will work best for you.  You can also get hearing aids over the counter for mild to moderate hearing loss.  Have hearing tests as your doctor suggests. They can show whether your hearing has changed. Your hearing aid may need to be adjusted.  Use other devices as needed. These may include:  Telephone amplifiers and hearing aids that can connect to a television, stereo, radio, or microphone.  Devices that use lights or vibrations. These alert you to the doorbell, a ringing telephone, or a baby monitor.  Television closed-captioning. This shows the words at the bottom of the screen. Most new TVs can do this.  TTY (text telephone). This lets you type messages back and forth on the telephone instead of talking or listening. These devices are also called TDD. When messages are typed on the keyboard, they are sent over the phone line to a receiving TTY. The message is shown on a monitor.  Use text messaging, social media, and email if it is hard for you to communicate by telephone.  Try to learn a listening technique called  "speechreading. It is not lipreading. You pay attention to people's gestures, expressions, posture, and tone of voice. These clues can help you understand what a person is saying. Face the person you are talking to, and have them face you. Make sure the lighting is good. You need to see the other person's face clearly.  Think about counseling if you need help to adjust to your hearing loss.  When should you call for help?  Watch closely for changes in your health, and be sure to contact your doctor if:    You think your hearing is getting worse.     You have new symptoms, such as dizziness or nausea.   Where can you learn more?  Go to https://www.[employer].e-contratos/patiented  Enter R798 in the search box to learn more about \"Hearing Loss: Care Instructions.\"  Current as of: September 27, 2023  Content Version: 14.3    2024 [employer].   Care instructions adapted under license by your healthcare professional. If you have questions about a medical condition or this instruction, always ask your healthcare professional. [employer] disclaims any warranty or liability for your use of this information.    Learning About Stress  What is stress?     Stress is your body's response to a hard situation. Your body can have a physical, emotional, or mental response. Stress is a fact of life for most people, and it affects everyone differently. What causes stress for you may not be stressful for someone else.  A lot of things can cause stress. You may feel stress when you go on a job interview, take a test, or run a race. This kind of short-term stress is normal and even useful. It can help you if you need to work hard or react quickly. For example, stress can help you finish an important job on time.  Long-term stress is caused by ongoing stressful situations or events. Examples of long-term stress include long-term health problems, ongoing problems at work, or conflicts in your family. Long-term stress can " harm your health.  How does stress affect your health?  When you are stressed, your body responds as though you are in danger. It makes hormones that speed up your heart, make you breathe faster, and give you a burst of energy. This is called the fight-or-flight stress response. If the stress is over quickly, your body goes back to normal and no harm is done.  But if stress happens too often or lasts too long, it can have bad effects. Long-term stress can make you more likely to get sick, and it can make symptoms of some diseases worse. If you tense up when you are stressed, you may develop neck, shoulder, or low back pain. Stress is linked to high blood pressure and heart disease.  Stress also harms your emotional health. It can make you gonzalez, tense, or depressed. Your relationships may suffer, and you may not do well at work or school.  What can you do to manage stress?  You can try these things to help manage stress:   Do something active. Exercise or activity can help reduce stress. Walking is a great way to get started. Even everyday activities such as housecleaning or yard work can help.  Try yoga or evangelist chi. These techniques combine exercise and meditation. You may need some training at first to learn them.  Do something you enjoy. For example, listen to music or go to a movie. Practice your hobby or do volunteer work.  Meditate. This can help you relax, because you are not worrying about what happened before or what may happen in the future.  Do guided imagery. Imagine yourself in any setting that helps you feel calm. You can use online videos, books, or a teacher to guide you.  Do breathing exercises. For example:  From a standing position, bend forward from the waist with your knees slightly bent. Let your arms dangle close to the floor.  Breathe in slowly and deeply as you return to a standing position. Roll up slowly and lift your head last.  Hold your breath for just a few seconds in the standing  "position.  Breathe out slowly and bend forward from the waist.  Let your feelings out. Talk, laugh, cry, and express anger when you need to. Talking with supportive friends or family, a counselor, or a miquel leader about your feelings is a healthy way to relieve stress. Avoid discussing your feelings with people who make you feel worse.  Write. It may help to write about things that are bothering you. This helps you find out how much stress you feel and what is causing it. When you know this, you can find better ways to cope.  What can you do to prevent stress?  You might try some of these things to help prevent stress:  Manage your time. This helps you find time to do the things you want and need to do.  Get enough sleep. Your body recovers from the stresses of the day while you are sleeping.  Get support. Your family, friends, and community can make a difference in how you experience stress.  Limit your news feed. Avoid or limit time on social media or news that may make you feel stressed.  Do something active. Exercise or activity can help reduce stress. Walking is a great way to get started.  Where can you learn more?  Go to https://www.[employer].net/patiented  Enter N032 in the search box to learn more about \"Learning About Stress.\"  Current as of: October 24, 2023  Content Version: 14.3    2024 [employer].   Care instructions adapted under license by your healthcare professional. If you have questions about a medical condition or this instruction, always ask your healthcare professional. [employer] disclaims any warranty or liability for your use of this information.    Learning About Sleeping Well  What does sleeping well mean?     Sleeping well means getting enough sleep to feel good and stay healthy. How much sleep is enough varies among people.  The number of hours you sleep and how you feel when you wake up are both important. If you do not feel refreshed, you probably need more " "sleep. Another sign of not getting enough sleep is feeling tired during the day.  Experts recommend that adults get at least 7 or more hours of sleep per day. Children and older adults need more sleep.  Why is getting enough sleep important?  Getting enough quality sleep is a basic part of good health. When your sleep suffers, your physical health, mood, and your thoughts can suffer too. You may find yourself feeling more grumpy or stressed. Not getting enough sleep also can lead to serious problems, including injury, accidents, anxiety, and depression.  What might cause poor sleeping?  Many things can cause sleep problems, including:  Changes to your sleep schedule.  Stress. Stress can be caused by fear about a single event, such as giving a speech. Or you may have ongoing stress, such as worry about work or school.  Depression, anxiety, and other mental or emotional conditions.  Changes in your sleep habits or surroundings. This includes changes that happen where you sleep, such as noise, light, or sleeping in a different bed. It also includes changes in your sleep pattern, such as having jet lag or working a late shift.  Health problems, such as pain, breathing problems, and restless legs syndrome.  Lack of regular exercise.  Using alcohol, nicotine, or caffeine before bed.  How can you help yourself?  Here are some tips that may help you sleep more soundly and wake up feeling more refreshed.  Your sleeping area   Use your bedroom only for sleeping and sex. A bit of light reading may help you fall asleep. But if it doesn't, do your reading elsewhere in the house. Try not to use your TV, computer, smartphone, or tablet while you are in bed.  Be sure your bed is big enough to stretch out comfortably, especially if you have a sleep partner.  Keep your bedroom quiet, dark, and cool. Use curtains, blinds, or a sleep mask to block out light. To block out noise, use earplugs, soothing music, or a \"white noise\" " "machine.  Your evening and bedtime routine   Create a relaxing bedtime routine. You might want to take a warm shower or bath, or listen to soothing music.  Go to bed at the same time every night. And get up at the same time every morning, even if you feel tired.  What to avoid   Limit caffeine (coffee, tea, caffeinated sodas) during the day, and don't have any for at least 6 hours before bedtime.  Avoid drinking alcohol before bedtime. Alcohol can cause you to wake up more often during the night.  Try not to smoke or use tobacco, especially in the evening. Nicotine can keep you awake.  Limit naps during the day, especially close to bedtime.  Avoid lying in bed awake for too long. If you can't fall asleep or if you wake up in the middle of the night and can't get back to sleep within about 20 minutes, get out of bed and go to another room until you feel sleepy.  Avoid taking medicine right before bed that may keep you awake or make you feel hyper or energized. Your doctor can tell you if your medicine may do this and if you can take it earlier in the day.  If you can't sleep   Imagine yourself in a peaceful, pleasant scene. Focus on the details and feelings of being in a place that is relaxing.  Get up and do a quiet or boring activity until you feel sleepy.  Avoid drinking any liquids before going to bed to help prevent waking up often to use the bathroom.  Where can you learn more?  Go to https://www.[employer].net/patiented  Enter J942 in the search box to learn more about \"Learning About Sleeping Well.\"  Current as of: July 31, 2024  Content Version: 14.3    2024 [employer].   Care instructions adapted under license by your healthcare professional. If you have questions about a medical condition or this instruction, always ask your healthcare professional. [employer] disclaims any warranty or liability for your use of this information.    Bladder Training: Care Instructions  Your Care " Instructions     Bladder training is used to treat urge incontinence and stress incontinence. Urge incontinence means that the need to urinate comes on so fast that you can't get to a toilet in time. Stress incontinence means that you leak urine because of pressure on your bladder. For example, it may happen when you laugh, cough, or lift something heavy.  Bladder training can increase how long you can wait before you have to urinate. It can also help your bladder hold more urine. And it can give you better control over the urge to urinate.  It is important to remember that bladder training takes a few weeks to a few months to make a difference. You may not see results right away, but don't give up.  Follow-up care is a key part of your treatment and safety. Be sure to make and go to all appointments, and call your doctor if you are having problems. It's also a good idea to know your test results and keep a list of the medicines you take.  How can you care for yourself at home?  Work with your doctor to come up with a bladder training program that is right for you. You may use one or more of the following methods.  Delayed urination  In the beginning, try to keep from urinating for 5 minutes after you first feel the need to go.  While you wait, take deep, slow breaths to relax. Kegel exercises can also help you delay the need to go to the bathroom.  After some practice, when you can easily wait 5 minutes to urinate, try to wait 10 minutes before you urinate.  Slowly increase the waiting period until you are able to control when you have to urinate.  Scheduled urination  Empty your bladder when you first wake up in the morning.  Schedule times throughout the day when you will urinate.  Start by going to the bathroom every hour, even if you don't need to go.  Slowly increase the time between trips to the bathroom.  When you have found a schedule that works well for you, keep doing it.  If you wake up during the night  "and have to urinate, do it. Apply your schedule to waking hours only.  Kegel exercises  These tighten and strengthen pelvic muscles, which can help you control the flow of urine. (If doing these exercises causes pain, stop doing them and talk with your doctor.) To do Kegel exercises:  Squeeze your muscles as if you were trying not to pass gas. Or squeeze your muscles as if you were stopping the flow of urine. Your belly, legs, and buttocks shouldn't move.  Hold the squeeze for 3 seconds, then relax for 5 to 10 seconds.  Start with 3 seconds, then add 1 second each week until you are able to squeeze for 10 seconds.  Repeat the exercise 10 times a session. Do 3 to 8 sessions a day.  When should you call for help?  Watch closely for changes in your health, and be sure to contact your doctor if:    Your incontinence is getting worse.     You do not get better as expected.   Where can you learn more?  Go to https://www.Canary.net/patiented  Enter V684 in the search box to learn more about \"Bladder Training: Care Instructions.\"  Current as of: April 30, 2024  Content Version: 14.3    2024 [employer].   Care instructions adapted under license by your healthcare professional. If you have questions about a medical condition or this instruction, always ask your healthcare professional. [employer] disclaims any warranty or liability for your use of this information.    Learning About Depression Screening  What is depression screening?  Depression screening is a way to see if you have depression symptoms. It may be done by a doctor or counselor. It's often part of a routine checkup. That's because your mental health is just as important as your physical health.  Depression is a mental health condition that affects how you feel, think, and act. You may:  Have less energy.  Lose interest in your daily activities.  Feel sad and grouchy for a long time.  Depression is very common. It affects people of " "all ages.  Many things can lead to depression. Some people become depressed after they have a stroke or find out they have a major illness like cancer or heart disease. The death of a loved one or a breakup may lead to depression. It can run in families. Most experts believe that a combination of inherited genes and stressful life events can cause it.  What happens during screening?  You may be asked to fill out a form about your depression symptoms. You and the doctor will discuss your answers. The doctor may ask you more questions to learn more about how you think, act, and feel.  What happens after screening?  If you have symptoms of depression, your doctor will talk to you about your options.  Doctors usually treat depression with medicines or counseling. Often, combining the two works best. Many people don't get help because they think that they'll get over the depression on their own. But people with depression may not get better unless they get treatment.  The cause of depression is not well understood. There may be many factors involved. But if you have depression, it's not your fault.  A serious symptom of depression is thinking about death or suicide. If you or someone you care about talks about this or about feeling hopeless, get help right away.  It's important to know that depression can be treated. Medicine, counseling, and self-care may help.  Where can you learn more?  Go to https://www.Precipio.net/patiented  Enter T185 in the search box to learn more about \"Learning About Depression Screening.\"  Current as of: July 31, 2024  Content Version: 14.3    2024 [employer].   Care instructions adapted under license by your healthcare professional. If you have questions about a medical condition or this instruction, always ask your healthcare professional. [employer] disclaims any warranty or liability for your use of this information.       "

## 2025-01-23 NOTE — PROGRESS NOTES
Type 2 diabetes mellitus with hyperosmolarity without coma, without long-term current use of insulin (H)  A1c is better down to 6.7  Patient has been working on his weight and his diet, he has lost almost 40 pounds.  Continue with current diet plane  Continue with current medication  - Albumin Random Urine Quantitative with Creat Ratio; Future  - HEMOGLOBIN A1C; Future  - BASIC METABOLIC PANEL; Future  - Lipid panel reflex to direct LDL Non-fasting; Future  - Albumin Random Urine Quantitative with Creat Ratio  - HEMOGLOBIN A1C  - BASIC METABOLIC PANEL  - Lipid panel reflex to direct LDL Non-fasting     Primary osteoarthritis of right knee  - triamcinolone (KENALOG-40) injection 40 mg  - DRAIN/INJECT LARGE JOINT/BURSA     Primary osteoarthritis of left knee     - triamcinolone (KENALOG-40) injection 40 mg  - DRAIN/INJECT LARGE JOINT/BURSA        After discussion of various treatment options, and after reviewing risks and benefits and potential complications. And after signing a consent form, we elected to proceed with a cortisone injection for both knees.  The lateral aspect of both knee was cleaned , Betadine and Alcohol, then injected with 40 mg of Kenalog suspension along with 3 cc of 2% Lidocaine.  Patient tolerated injection well, givne post injection instructions.        Atrial fibrillation, unspecified type (H)  Stable, continue with current medications     Need for vaccination  Tdp booter today.     Colón catheter in place, condom  In good place     Urinary incontinence, unspecified type  Follow up with urology.           Patient denies ever Colón catheter in place, he does follow-up with urology.     He did ask for a letter for work.  He has been off work for the past 2 weeks.  He does work as a .  Given a letter to go back to work, with no restriction.

## 2025-01-27 ENCOUNTER — TELEPHONE (OUTPATIENT)
Dept: FAMILY MEDICINE | Facility: CLINIC | Age: 70
End: 2025-01-27
Payer: COMMERCIAL

## 2025-01-27 NOTE — TELEPHONE ENCOUNTER
MTM referral from: [facility] (referral by provider)    MTM referral outreach attempt #2 on January 27, 2025 at 10:56 AM      Outcome: Left Message    Use [facility] med adv for the carrier/Plan on the flowsheet      [employer] Message Sent    Karlene Goldberg CMA  MTM

## 2025-01-31 ENCOUNTER — TELEPHONE (OUTPATIENT)
Dept: CARDIOLOGY | Facility: CLINIC | Age: 70
End: 2025-01-31
Payer: COMMERCIAL

## 2025-01-31 NOTE — TELEPHONE ENCOUNTER
Health Call Center    Phone Message    May a detailed message be left on voicemail: yes     Reason for Call: Medication Question or concern regarding medication   Prescription Clarification  Name of Medication:   rivaroxaban ANTICOAGULANT (XARELTO) 20 MG TABS tablet [[id number]] (Order [id number])   Prescribing Provider: Carol     What on the order needs clarification? Pt was calling due to falling outside of the donut hole for his insurance. Price of medication went up by about 400% and pt is no longer able to afford the medication. Pt will need financial assistance or a different prescription going forward. Please call pt back to discuss.     Pt is out of medication as of today, but has not picked up refill due to price. 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       Pt was also wondering if Echo is needed with annual as he just did one and he has been paying a lot of bills/deductibles lately between systems/departments.     Action Taken: Other: cardiology     Travel Screening: Not Applicable    Thank you!  [facility]

## 2025-01-31 NOTE — TELEPHONE ENCOUNTER
Attempted to call patient. Phone rang. No answer. Unable to leave message at this time.    Isi Roberts, RN, BSN  Cardiology RN Care Coordinator   [place]   Phone: [phone]  Fax: [phone] ([place]) [phone] ([place])

## 2025-02-03 ENCOUNTER — TELEPHONE (OUTPATIENT)
Dept: CARDIOLOGY | Facility: CLINIC | Age: 70
End: 2025-02-03
Payer: COMMERCIAL

## 2025-02-03 ENCOUNTER — MYC MEDICAL ADVICE (OUTPATIENT)
Dept: CARDIOLOGY | Facility: CLINIC | Age: 70
End: 2025-02-03

## 2025-02-03 DIAGNOSIS — I48.91 ATRIAL FIBRILLATION, UNSPECIFIED TYPE (H): Primary | ICD-10-CM

## 2025-02-03 NOTE — LETTER
February 26, 2025      TO: Malcom Coates  [address] [address]         Dear Malcom,    We are having difficulty connecting with you over the phone and via [facility].  We would like to continue our discussion for anticoagulation.  We are here to help you.  I am hopeful you will reach out to the cardiology team to discuss options for anticoagulation.          Sincerely,      Sandy Bullock, RN  Cardiology Care Coordinator  [facility]  [phone] option 1

## 2025-02-03 NOTE — TELEPHONE ENCOUNTER
30 days supply test claims requested for the drugs below:  Eliquis 5mg BID, Pradaxa 150mg BID      Eliquis 5mg BID  -$47.00 copay        Pradaxa 150mg  -PA needed

## 2025-02-04 NOTE — TELEPHONE ENCOUNTER
Attempted to contact patient, no answer, vm box if full unable to leave a message.  Will try again at a later time.    Sandy Bullock, RN  Cardiology Care Coordinator  [facility]  [phone] option 1

## 2025-02-06 NOTE — TELEPHONE ENCOUNTER
Attempted to contact patient, no answer, mailbox is full.  TeamRockhart message not reviewed.  Will try again soon.    Sandy Bullock RN  Cardiology Care Coordinator  [facility]  [phone] option 1

## 2025-02-11 ENCOUNTER — DOCUMENTATION ONLY (OUTPATIENT)
Dept: ANTICOAGULATION | Facility: CLINIC | Age: 70
End: 2025-02-11
Payer: COMMERCIAL

## 2025-02-11 NOTE — PROGRESS NOTES
Anticoagulant Therapeutic Duplication    Duplicate orders identified: different medication of the same therapeutic class    Writer spoke to pharmacy, patient has not picked up apixaban prescription, yet. Pharmacist reports it will cost $400, they did not run voucher for free 30 day supply as is written on prescription. This writer will not discontinue xarelto prescription until a.) confirmation that patient has picked up eliquis or b.) patient is willing to pay $400.    Active anticoagulant: rivaroxaban (Xarelto)    Writer will review chart in 14 days.    Plan made per [facility] anticoagulation protocol.    Mary Mcgee, RN  2/11/2025

## 2025-02-26 NOTE — TELEPHONE ENCOUNTER
Attempted to contact patient, no answer, mailbox is full.  We have tried to contact patient multiple time with no luck.      Letter prepared and printed.  Please mail letter out.    Sandy Bullock RN

## 2025-02-27 NOTE — PROGRESS NOTES
Writer spoke to pharmacy, patient picked up 5 mg eliquis on 2/14/25.    Mary Mcgee RN  Anticoagulation Clinic

## 2025-03-26 ENCOUNTER — MYC MEDICAL ADVICE (OUTPATIENT)
Dept: CARDIOLOGY | Facility: CLINIC | Age: 70
End: 2025-03-26
Payer: COMMERCIAL

## 2025-03-26 DIAGNOSIS — I48.91 ATRIAL FIBRILLATION, UNSPECIFIED TYPE (H): Primary | ICD-10-CM

## 2025-03-27 ENCOUNTER — TELEPHONE (OUTPATIENT)
Dept: CARDIOLOGY | Facility: CLINIC | Age: 70
End: 2025-03-27
Payer: COMMERCIAL

## 2025-03-27 DIAGNOSIS — I48.91 ATRIAL FIBRILLATION, UNSPECIFIED TYPE (H): Primary | ICD-10-CM

## 2025-03-27 RX ORDER — WARFARIN SODIUM 5 MG/1
5 TABLET ORAL DAILY
Qty: 30 TABLET | Refills: 0 | Status: CANCELLED | OUTPATIENT
Start: 2025-03-27

## 2025-03-27 NOTE — TELEPHONE ENCOUNTER
Attempted to contact patient, no luck, unable to leave message voicemail box is full    Date: 3/27/2025    Time of Call: 3:51 PM     Diagnosis:  afib     [ VORB ] Ordering provider: Dr. Medina  Order: start coumadin in place of Eliquis  Coumadin 5 mg daily  Check INR 3 days after starting coumadin  INR range 2.0-3.0   Lifelong therapy     Order received by: Sandy Bullock RN       Follow-up/additional notes: coumadin can be started in place of DOAC.  Once speak with patient then orders will be signed.  Need to confirm pharmacy        Sandy Bullock RN  Cardiology Care Coordinator  [facility]  [phone] option 1

## 2025-03-27 NOTE — TELEPHONE ENCOUNTER
[facility] Call Center    Phone Message    May a detailed message be left on voicemail: yes     Reason for Call: Other: Bong called requesting to speak with his care team about how he should proceed with his Eliquis now that he is running out. Bong states there was a mention of putting him on Coumadin. Please review and reach out to Bong to discuss. Thank you!     Action Taken: Other: Cardiology    Travel Screening: Not Applicable    Thank you!  [facility]       Date of Service:

## 2025-03-28 ENCOUNTER — ANCILLARY PROCEDURE (OUTPATIENT)
Dept: GENERAL RADIOLOGY | Facility: CLINIC | Age: 70
End: 2025-03-28
Payer: COMMERCIAL

## 2025-03-28 DIAGNOSIS — R05.1 ACUTE COUGH: ICD-10-CM

## 2025-03-28 PROCEDURE — 71046 X-RAY EXAM CHEST 2 VIEWS: CPT | Mod: TC | Performed by: RADIOLOGY

## 2025-04-02 ENCOUNTER — ANTICOAGULATION THERAPY VISIT (OUTPATIENT)
Dept: ANTICOAGULATION | Facility: CLINIC | Age: 70
End: 2025-04-02
Payer: COMMERCIAL

## 2025-04-02 DIAGNOSIS — I48.91 ATRIAL FIBRILLATION, UNSPECIFIED TYPE (H): Primary | ICD-10-CM

## 2025-04-02 RX ORDER — WARFARIN SODIUM 5 MG/1
5 TABLET ORAL DAILY
Qty: 30 TABLET | Refills: 0 | Status: SHIPPED | OUTPATIENT
Start: 2025-04-02

## 2025-04-02 NOTE — PROGRESS NOTES
Received from cardiology:    Spoke to patient.  Patient will be out of eliquis in 3 days.  Patient is agreeable to start coumadin in place of eliquis.  Is aware that there is frequent monitoring and INR clinic will dose his coumadin daily schedule.     Coumadin Rx sent to pharmacy.  Patient will start coumadin on Sunday, April 6th.     INR referral placed.     INR lab scheduled.     Patient prefers communication via telephone due to difficulty with mychart.  He is available best times 10:30 am-1:00 pm due to work schedule as a .     Sandy Bullock RN  Cardiology Care Coordinator  [facility]  [phone] option 1

## 2025-04-02 NOTE — TELEPHONE ENCOUNTER
See 4/2/25 [facility] encounter.    Sandy Dunlap RN  [facility] Anticoagulation [facility]

## 2025-04-02 NOTE — TELEPHONE ENCOUNTER
Spoke to patient.  Patient will be out of eliquis in 3 days.  Patient is agreeable to start coumadin in place of eliquis.  Is aware that there is frequent monitoring and INR clinic will dose his coumadin daily schedule.    Coumadin Rx sent to pharmacy.  Patient will start coumadin on Sunday, April 6th.    INR referral placed.    INR lab scheduled.    Patient prefers communication via telephone due to difficulty with mychart.  He is available best times 10:30 am-1:00 pm due to work schedule as a .    Sandy Bullock RN  Cardiology Care Coordinator  [facility]  [phone] option 1

## 2025-04-03 NOTE — PROGRESS NOTES
Attempted to reach patient to discuss starting warfarin. Mailbox is full and unable to leave a message.  New patient packet mailed to patient's home.    Sandy Dunlap RN  [facility] Anticoagulation Clinic

## 2025-04-04 NOTE — PROGRESS NOTES
Unable to reach patient, no answer or . Verdeeco message sent.     Aidee Gross RN   [facility] Anticoagulation [facility]

## 2025-04-07 NOTE — PROGRESS NOTES
Unable to reach patient. VM is full at this time.  He does have an INR scheduled for 4/9/25 and warfarin dosing was discussed with cardiology nurse. Will follow up with patient once his INR is drawn.      Sandy Dunlap RN  [facility] Anticoagulation Clinic

## 2025-04-09 ENCOUNTER — ANTICOAGULATION THERAPY VISIT (OUTPATIENT)
Dept: ANTICOAGULATION | Facility: CLINIC | Age: 70
End: 2025-04-09

## 2025-04-09 ENCOUNTER — LAB (OUTPATIENT)
Dept: LAB | Facility: CLINIC | Age: 70
End: 2025-04-09
Payer: COMMERCIAL

## 2025-04-09 DIAGNOSIS — I48.91 ATRIAL FIBRILLATION, UNSPECIFIED TYPE (H): Primary | ICD-10-CM

## 2025-04-09 DIAGNOSIS — I48.91 ATRIAL FIBRILLATION, UNSPECIFIED TYPE (H): ICD-10-CM

## 2025-04-09 DIAGNOSIS — E11.42 DIABETIC POLYNEUROPATHY ASSOCIATED WITH TYPE 2 DIABETES MELLITUS (H): Primary | ICD-10-CM

## 2025-04-09 LAB — INR BLD: 1.2 (ref 0.9–1.1)

## 2025-04-09 PROCEDURE — 36416 COLLJ CAPILLARY BLOOD SPEC: CPT

## 2025-04-09 PROCEDURE — 85610 PROTHROMBIN TIME: CPT

## 2025-04-09 NOTE — PROGRESS NOTES
"ANTICOAGULATION  MANAGEMENT: NEW REFERRAL      SUBJECTIVE/OBJECTIVE     Malcom Coates, a 69 year old male  is newly referred to [facility].    Anticoagulation:    Previously on warfarin: No, has been on Apixaban (Eliquis); transitioning to warfarin.  Warfarin initiation date (approximate): 4/6/25   Indication(s): Atrial Fibrillation   Goal Range: 2.0-3.0   Anticoagulation Bridge/Overlap: No   Referring provider: from [facility] provider    Results:        Recent labs: (last 7 days)     04/09/25  1037   INR 1.2*       Wt Readings from Last 2 Encounters:   03/28/25 (!) 170.6 kg (376 lb)   01/23/25 (!) 171.5 kg (378 lb)      Estimated body mass index is 47 kg/m  as calculated from the following:    Height as of 1/23/25: 1.905 m (6' 3\").    Weight as of 3/28/25: 170.6 kg (376 lb).  Lab Results   Component Value Date    AST 19 04/04/2023    ALT 14 04/04/2023    ALBUMIN 4.2 04/04/2023     Lab Results   Component Value Date    CR 1.15 06/18/2024     Estimated Creatinine Clearance: 100.5 mL/min (based on SCr of 1.15 mg/dL).    ASSESSMENT     Goal INR 2-3, standard for indication(s) above  Establishing initial warfarin maintenance dose (on warfarin < 30 days)     PLAN     Dosing Instructions: Increase your warfarin dose (28% change) with INR in 5 days       Summary  As of 4/9/2025      Full warfarin instructions:  4/9: 5 mg; 4/10: 7.5 mg; Otherwise 5 mg every Mon, Wed, Fri; 7.5 mg all other days   Next INR check:  4/14/2025               Education provided:   Goal range and lab monitoring: goal range and significance of current result, Importance of therapeutic range, and frequency of lab work when starting warfarin and importance of following up when instructed (extends after stability established)  Dietary considerations: importance of consistent vitamin K intake  Symptom monitoring: monitoring for bleeding signs and symptoms, monitoring for clotting signs and symptoms, and when to seek " medical attention/emergency care  Contact [phone] with any changes, questions or concerns.     Education still needed:   Contact [phone] with any changes, questions or concerns.     Spoke with daughter Rosio on 4/10/25 and orders given    Sent conXt message with dosing and follow up instructions  Unable to leave a message, voicemail is full.  Did leave a message with his daughter on his CTC to have him call us back.      Lab visit scheduled    Standing orders placed in Epic: Point of Care INR (Lab 5000)    Plan made per [facility] anticoagulation protocol    Sandy Dunlap, RN  Anticoagulation Clinic  4/9/2025

## 2025-04-09 NOTE — PROGRESS NOTES
Attempted several times to reach patient by phone and the voicemail is full and unable to leave a message.  My chart message sent asking him to call the [facility] or let us know when a good time is to reach him. Did also leave a message with his daughter Rosio who is on his consent to communicate to see if she could have him call us back.      Sandy Dunlap RN  [facility] Anticoagulation Clinic

## 2025-04-09 NOTE — PROGRESS NOTES
Unable to reach pt or leave a VM. Several attempts made today. MyChart sent unread at this time.  Teodora Potts RN on 4/9/2025 at 4:33 PM

## 2025-04-09 NOTE — PROGRESS NOTES
See [facility] encounter from 4/89/25    Sandy Dunlap RN  [facility] Anticoagulation [facility]

## 2025-04-14 ENCOUNTER — LAB (OUTPATIENT)
Dept: LAB | Facility: CLINIC | Age: 70
End: 2025-04-14
Payer: COMMERCIAL

## 2025-04-14 ENCOUNTER — ANTICOAGULATION THERAPY VISIT (OUTPATIENT)
Dept: ANTICOAGULATION | Facility: CLINIC | Age: 70
End: 2025-04-14

## 2025-04-14 DIAGNOSIS — I48.91 ATRIAL FIBRILLATION, UNSPECIFIED TYPE (H): Primary | ICD-10-CM

## 2025-04-14 DIAGNOSIS — I48.91 ATRIAL FIBRILLATION, UNSPECIFIED TYPE (H): ICD-10-CM

## 2025-04-14 LAB — INR BLD: 3.8 (ref 0.9–1.1)

## 2025-04-14 PROCEDURE — 85610 PROTHROMBIN TIME: CPT

## 2025-04-14 PROCEDURE — 36416 COLLJ CAPILLARY BLOOD SPEC: CPT

## 2025-04-14 NOTE — PROGRESS NOTES
ANTICOAGULATION MANAGEMENT     Malcom Coates 69 year old male is on warfarin with supratherapeutic INR result. (Goal INR 2.0-3.0)    Recent labs: (last 7 days)     04/14/25  1330   INR 3.8*       ASSESSMENT     Source(s): Chart Review and Patient/Caregiver Call     Warfarin doses taken: Warfarin taken as instructed  Diet: No new diet changes identified  Medication/supplement changes: None noted  New illness, injury, or hospitalization: No  Signs or symptoms of bleeding or clotting: No  Previous result: Subtherapeutic  Additional findings: New start on day 9 of warfarin       PLAN     Recommended plan for no diet, medication or health factor changes affecting INR     Dosing Instructions: partial hold then decrease your warfarin dose (11.1% change) with next INR in 1 week       Summary  As of 4/14/2025      Full warfarin instructions:  4/14: 2.5 mg; Otherwise 7.5 mg every Mon, Thu; 5 mg all other days   Next INR check:  4/21/2025               Telephone call with daughter Rosio who verbalizes understanding and agrees to plan and who agrees to plan and repeated back plan correctly    Lab visit scheduled    Education provided: Please call back if any changes to your diet, medications or how you've been taking warfarin    Plan made per [facility] anticoagulation protocol    Corey Moon, RN  4/14/2025  Anticoagulation Clinic  [employer] for routing messages: p ANTICOAG NEW [facility]  [facility] patient phone line: [phone]        _______________________________________________________________________     Anticoagulation Episode Summary       Current INR goal:  2.0-3.0   TTR:  38.5% (2 d)   Target end date:  Indefinite   Send INR reminders to:  ANTICOAG [facility]    Indications    Atrial fibrillation  unspecified type (H) [I48.91]             Comments:  --             Anticoagulation Care Providers       Provider Role Specialty Phone number    Sandy Medina MD Referring Cardiovascular Disease [phone]

## 2025-04-21 ENCOUNTER — ANTICOAGULATION THERAPY VISIT (OUTPATIENT)
Dept: ANTICOAGULATION | Facility: CLINIC | Age: 70
End: 2025-04-21

## 2025-04-21 ENCOUNTER — LAB (OUTPATIENT)
Dept: LAB | Facility: CLINIC | Age: 70
End: 2025-04-21
Payer: COMMERCIAL

## 2025-04-21 ENCOUNTER — MYC MEDICAL ADVICE (OUTPATIENT)
Dept: ANTICOAGULATION | Facility: CLINIC | Age: 70
End: 2025-04-21

## 2025-04-21 DIAGNOSIS — I48.91 ATRIAL FIBRILLATION, UNSPECIFIED TYPE (H): ICD-10-CM

## 2025-04-21 DIAGNOSIS — I48.91 ATRIAL FIBRILLATION, UNSPECIFIED TYPE (H): Primary | ICD-10-CM

## 2025-04-21 LAB — INR BLD: 4.8 (ref 0.9–1.1)

## 2025-04-21 PROCEDURE — 85610 PROTHROMBIN TIME: CPT

## 2025-04-21 PROCEDURE — 36416 COLLJ CAPILLARY BLOOD SPEC: CPT

## 2025-04-21 NOTE — PROGRESS NOTES
ANTICOAGULATION MANAGEMENT     Malcom Coates 69 year old male is on warfarin with supratherapeutic INR result. (Goal INR 2.0-3.0)    Recent labs: (last 7 days)     04/21/25  1330   INR 4.8*       ASSESSMENT     Warfarin Lab Questionnaire    Warfarin Doses Last 7 Days      4/21/2025    11:28 AM   Dose in Tablet or Mg   TAB or MG? tablet (tab)     Pt Rptd Dose SUNDAY MONDAY TUESDAY WED THURS FRIDAY SATURDAY 4/21/2025  11:28 AM 1 1 1 1 1.5 1 1         4/21/2025   Warfarin Lab Questionnaire   Missed doses within past 14 days? No   Changes in diet or alcohol within past 14 days? No   Medication changes since last result? No   Injuries or illness since last result? No   New shortness of breath, severe headaches or sudden changes in vision since last result? No   Abnormal bleeding since last result? No   Upcoming surgery, procedure? No     Previous result: Supratherapeutic  Additional findings: New start on day 16 of warfarin       PLAN     Unable to reach Rosio today.    Left message to hold warfarin tonight. Request call back for assessment.  My chart message sent also.     Follow up required to discuss out of range result     Sandy Dunlap, RN  4/21/2025  Anticoagulation Clinic  [facility] for routing messages: p ANTICOAG NEW [facility] patient phone line: [phone]

## 2025-04-22 NOTE — PROGRESS NOTES
ANTICOAGULATION MANAGEMENT     Malcom Coates 69 year old male is on warfarin with supratherapeutic INR result. (Goal INR 2.0-3.0)    Recent labs: (last 7 days)     04/21/25  1330   INR 4.8*       ASSESSMENT     Warfarin Lab Questionnaire    Warfarin Doses Last 7 Days      4/21/2025    11:28 AM   Dose in Tablet or Mg   TAB or MG? tablet (tab)     Pt Rptd Dose SUNDAY MONDAY TUESDAY WED THURS FRIDAY SATURDAY 4/21/2025  11:28 AM 1 1 1 1 1.5 1 1         4/21/2025   Warfarin Lab Questionnaire   Missed doses within past 14 days? No   Changes in diet or alcohol within past 14 days? No   Medication changes since last result? No   Injuries or illness since last result? No   New shortness of breath, severe headaches or sudden changes in vision since last result? No   Abnormal bleeding since last result? No   Upcoming surgery, procedure? No     Previous result: Supratherapeutic  Additional findings: None       PLAN     Recommended plan for no diet, medication or health factor changes affecting INR     Dosing Instructions: hold dose then decrease your warfarin dose (18% change) with next INR in 3-4 days   took dose Monday, will hold on Tuesday.     Summary  As of 4/21/2025      Full warfarin instructions:  4/22: Hold; Otherwise 2.5 mg every Wed; 5 mg all other days   Next INR check:  4/25/2025               Sent Long Play message with dosing and follow up instructions  Patient responded by my chart.    Contact [phone] to schedule and with any changes, questions or concerns.     Education provided: Contact [phone] with any changes, questions or concerns.     Plan made per [facility] anticoagulation protocol    Sandy Dunlap, RN  4/22/2025  Anticoagulation Clinic  [employer] for routing messages: p ANTICOAG NEW [facility] patient phone line: [phone]        _______________________________________________________________________     Anticoagulation Episode Summary       Current INR goal:  2.0-3.0   TTR:  10.3%  (1.3 wk)   Target end date:  Indefinite   Send INR reminders to:  [facility]ICOAG [facility]    Indications    Atrial fibrillation  unspecified type (H) [I48.91]             Comments:  --             Anticoagulation Care Providers       Provider Role Specialty Phone number    Sandy Medina MD Referring Cardiovascular Disease [phone]

## 2025-04-28 ENCOUNTER — LAB (OUTPATIENT)
Dept: LAB | Facility: CLINIC | Age: 70
End: 2025-04-28
Payer: COMMERCIAL

## 2025-04-28 ENCOUNTER — ANTICOAGULATION THERAPY VISIT (OUTPATIENT)
Dept: ANTICOAGULATION | Facility: CLINIC | Age: 70
End: 2025-04-28

## 2025-04-28 DIAGNOSIS — E11.42 DIABETIC POLYNEUROPATHY ASSOCIATED WITH TYPE 2 DIABETES MELLITUS (H): Primary | ICD-10-CM

## 2025-04-28 DIAGNOSIS — I48.91 ATRIAL FIBRILLATION, UNSPECIFIED TYPE (H): ICD-10-CM

## 2025-04-28 DIAGNOSIS — I48.91 ATRIAL FIBRILLATION, UNSPECIFIED TYPE (H): Primary | ICD-10-CM

## 2025-04-28 LAB — INR BLD: 6.2 (ref 0.9–1.1)

## 2025-04-28 PROCEDURE — 85610 PROTHROMBIN TIME: CPT

## 2025-04-28 PROCEDURE — 36416 COLLJ CAPILLARY BLOOD SPEC: CPT

## 2025-04-28 NOTE — PROGRESS NOTES
ANTICOAGULATION MANAGEMENT     Malcom Coates 69 year old male is on warfarin with supratherapeutic INR result. (Goal INR 2.0-3.0)    Recent labs: (last 7 days)     04/28/25  1004   INR 6.2*       ASSESSMENT     Source(s): Chart Review and Patient/Caregiver Call     Warfarin doses taken: More warfarin taken than planned which may be affecting INR - Pt thought he was suppose to take 2.5 tablets on Wed NOT 2.5 mg. Calendar updated  Diet: No new diet changes identified  Medication/supplement changes: None noted  New illness, injury, or hospitalization: No  Signs or symptoms of bleeding or clotting: No  Previous result: Supratherapeutic  Additional findings: None       PLAN     Recommended plan for temporary change(s) affecting INR     Dosing Instructions: hold 2 doses then continue your current warfarin dose with next INR in 3 days       Summary  As of 4/28/2025      Full warfarin instructions:  4/28: Hold; 4/29: Hold; Otherwise 2.5 mg every Fri; 5 mg all other days   Next INR check:  4/30/2025               Telephone call with Bong who verbalizes understanding and agrees to plan and who agrees to plan and repeated back plan correctly  Sent [employer] message with dosing and follow up instructions    Lab visit scheduled    Education provided: Taking warfarin: importance of following [facility] instructions vs instructions on the prescription bottle and Importance of taking warfarin as instructed  Goal range and lab monitoring: goal range and significance of current result, Importance of therapeutic range, and frequency of lab work when starting warfarin and importance of following up when instructed (extends after stability established)  Symptom monitoring: monitoring for bleeding signs and symptoms, when to seek medical attention/emergency care, and if you hit your head or have a bad fall seek emergency care    Plan made per [facility] anticoagulation protocol    Corey Moon, RN  4/28/2025  Anticoagulation Clinic  [facility]  for routing messages: p ANTICOAG [facility]  [facility] patient phone line: [phone]        _______________________________________________________________________     Anticoagulation Episode Summary       Current INR goal:  2.0-3.0   TTR:  6.0% (2.3 wk)   Target end date:  Indefinite   Send INR reminders to:  ANTICOAG [facility]    Indications    Atrial fibrillation  unspecified type (H) [I48.91]             Comments:  --             Anticoagulation Care Providers       Provider Role Specialty Phone number    Sandy Medina MD Referring Cardiovascular Disease [phone]

## 2025-04-29 DIAGNOSIS — I48.19 PERSISTENT ATRIAL FIBRILLATION (H): Primary | ICD-10-CM

## 2025-04-29 DIAGNOSIS — I48.91 ATRIAL FIBRILLATION, UNSPECIFIED TYPE (H): ICD-10-CM

## 2025-05-01 ENCOUNTER — LAB (OUTPATIENT)
Dept: LAB | Facility: CLINIC | Age: 70
End: 2025-05-01
Payer: COMMERCIAL

## 2025-05-01 ENCOUNTER — ANTICOAGULATION THERAPY VISIT (OUTPATIENT)
Dept: ANTICOAGULATION | Facility: CLINIC | Age: 70
End: 2025-05-01

## 2025-05-01 DIAGNOSIS — I48.91 ATRIAL FIBRILLATION, UNSPECIFIED TYPE (H): ICD-10-CM

## 2025-05-01 DIAGNOSIS — I48.91 ATRIAL FIBRILLATION, UNSPECIFIED TYPE (H): Primary | ICD-10-CM

## 2025-05-01 LAB — INR BLD: 2.8 (ref 0.9–1.1)

## 2025-05-01 RX ORDER — WARFARIN SODIUM 5 MG/1
TABLET ORAL
Qty: 90 TABLET | Refills: 1 | Status: SHIPPED | OUTPATIENT
Start: 2025-05-01

## 2025-05-01 NOTE — PROGRESS NOTES
ANTICOAGULATION MANAGEMENT     Malcom Coates 69 year old male is on warfarin with therapeutic INR result. (Goal INR 2.0-3.0)    Recent labs: (last 7 days)     05/01/25  1135   INR 2.8*       ASSESSMENT     Warfarin Lab Questionnaire    Warfarin Doses Last 7 Days    Pt Rptd Dose SUNDAY MONDAY TUESDAY WED THURS FRIDAY SATURDAY 5/1/2025   7:43 AM 5 0 0 5 0 5 5         5/1/2025   Warfarin Lab Questionnaire   Missed doses within past 14 days? No   Changes in diet or alcohol within past 14 days? No   Medication changes since last result? No   Injuries or illness since last result? No   New shortness of breath, severe headaches or sudden changes in vision since last result? No   Abnormal bleeding since last result? No   Upcoming surgery, procedure? No     Previous result: Supratherapeutic  Additional findings: None       PLAN     Recommended plan for temporary change(s) affecting INR - hold x 2 earlier this week due to high INR    Dosing Instructions: Continue your current warfarin dose with next INR in 5 days       Summary  As of 5/1/2025      Full warfarin instructions:  2.5 mg every Fri; 5 mg all other days   Next INR check:  5/6/2025               Detailed voice message left for prema Avelar with dosing instructions and follow up date.   Sent [employer] message with dosing and follow up instructions    Contact [phone] to schedule and with any changes, questions or concerns.     Education provided: Please call back if any changes to your diet, medications or how you've been taking warfarin    Plan made per [facility] anticoagulation protocol    Corey Moon RN  5/1/2025  Anticoagulation Clinic  [facility] for routing messages: p ANTICOAG [facility]  [facility] patient phone line: [phone]        _______________________________________________________________________     Anticoagulation Episode Summary       Current INR goal:  2.0-3.0   TTR:  6.0% (2.7 wk)   Target end date:  Indefinite   [facility]    Indications    Atrial fibrillation  unspecified type (H) [I48.91]             Comments:  --             Anticoagulation Care Providers       Provider Role Specialty Phone number    Sandy Medina MD Referring Cardiovascular Disease [phone]

## 2025-05-04 ENCOUNTER — HEALTH MAINTENANCE LETTER (OUTPATIENT)
Age: 70
End: 2025-05-04

## 2025-05-06 ENCOUNTER — ANTICOAGULATION THERAPY VISIT (OUTPATIENT)
Dept: ANTICOAGULATION | Facility: CLINIC | Age: 70
End: 2025-05-06

## 2025-05-06 ENCOUNTER — LAB (OUTPATIENT)
Dept: LAB | Facility: CLINIC | Age: 70
End: 2025-05-06
Payer: COMMERCIAL

## 2025-05-06 DIAGNOSIS — I48.91 ATRIAL FIBRILLATION, UNSPECIFIED TYPE (H): Primary | ICD-10-CM

## 2025-05-06 DIAGNOSIS — I48.91 ATRIAL FIBRILLATION, UNSPECIFIED TYPE (H): ICD-10-CM

## 2025-05-06 LAB — INR BLD: 2 (ref 0.9–1.1)

## 2025-05-06 PROCEDURE — 36416 COLLJ CAPILLARY BLOOD SPEC: CPT

## 2025-05-06 PROCEDURE — 85610 PROTHROMBIN TIME: CPT

## 2025-05-06 NOTE — PROGRESS NOTES
ANTICOAGULATION MANAGEMENT     Malcom Coates 69 year old male is on warfarin with therapeutic INR result. (Goal INR 2.0-3.0)    Recent labs: (last 7 days)     05/06/25  1336   INR 2.0*       ASSESSMENT     Source(s): Chart Review  Previous INR was Therapeutic last visit; previously outside of goal range  Medication, diet, health changes since last INR chart reviewed; none identified         PLAN     Recommended plan for no diet, medication or health factor changes affecting INR     Dosing Instructions: Continue your current warfarin dose with next INR in 10 weeks       Summary  As of 5/6/2025      Full warfarin instructions:  2.5 mg every Fri; 5 mg all other days   Next INR check:  --               Detailed voice message left for Rosio with dosing instructions and follow up date.     Lab visit scheduled    Education provided: Please call back if any changes to your diet, medications or how you've been taking warfarin    Plan made per [facility] anticoagulation protocol    Ayah Jacques RN  5/6/2025  Anticoagulation Clinic  [facility] for routing messages: p ANTICOAG [facility]  [facility] patient phone line: [phone]        _______________________________________________________________________     Anticoagulation Episode Summary       Current INR goal:  2.0-3.0   TTR:  25.4% (3.4 wk)   Target end date:  Indefinite   Send INR reminders to:  [facility]    Indications    Atrial fibrillation  unspecified type (H) [I48.91]             Comments:  --             Anticoagulation Care Providers       Provider Role Specialty Phone number    Sandy Medina MD Referring Cardiovascular Disease [phone]

## 2025-05-07 ENCOUNTER — MYC MEDICAL ADVICE (OUTPATIENT)
Dept: FAMILY MEDICINE | Facility: CLINIC | Age: 70
End: 2025-05-07
Payer: COMMERCIAL

## 2025-05-07 NOTE — TELEPHONE ENCOUNTER
Sent [employer] message to Malcom Coates in regards to their metformin, lisinopril, and atorvastatin being overdue for refill. Per our records last filled 1/19/25 for 100 day supply and is 8 days late to refill.      Monica Flower, PharmD, [facility]  Population Health Pharmacist  [phone]

## 2025-05-12 ENCOUNTER — TELEPHONE (OUTPATIENT)
Dept: FAMILY MEDICINE | Facility: CLINIC | Age: 70
End: 2025-05-12
Payer: COMMERCIAL

## 2025-05-12 NOTE — TELEPHONE ENCOUNTER
Patient Quality Outreach    Patient is due for the following:   Diabetes -  A1C, Diabetic Follow-Up Visit, and Foot Exam  Hypertension -  Hypertension follow-up visit    Action(s) Taken:   Schedule a office visit for Diabetes and Hypertension Follow up     Type of outreach:    Sent "[employer]" message.    Questions for provider review:    None         Ngozi Hernández CMA  Chart routed to Care Team.

## 2025-05-16 ENCOUNTER — RESULTS FOLLOW-UP (OUTPATIENT)
Dept: ANTICOAGULATION | Facility: CLINIC | Age: 70
End: 2025-05-16

## 2025-05-22 ENCOUNTER — LAB (OUTPATIENT)
Dept: LAB | Facility: CLINIC | Age: 70
End: 2025-05-22
Payer: COMMERCIAL

## 2025-05-22 ENCOUNTER — ANTICOAGULATION THERAPY VISIT (OUTPATIENT)
Dept: ANTICOAGULATION | Facility: CLINIC | Age: 70
End: 2025-05-22

## 2025-05-22 ENCOUNTER — RESULTS FOLLOW-UP (OUTPATIENT)
Dept: ANTICOAGULATION | Facility: CLINIC | Age: 70
End: 2025-05-22

## 2025-05-22 DIAGNOSIS — I48.91 ATRIAL FIBRILLATION, UNSPECIFIED TYPE (H): ICD-10-CM

## 2025-05-22 DIAGNOSIS — I48.91 ATRIAL FIBRILLATION, UNSPECIFIED TYPE (H): Primary | ICD-10-CM

## 2025-05-22 LAB — INR BLD: 2.5 (ref 0.9–1.1)

## 2025-05-22 NOTE — PROGRESS NOTES
ANTICOAGULATION MANAGEMENT     Malcom Coates 69 year old male is on warfarin with therapeutic INR result. (Goal INR 2.0-3.0)    Recent labs: (last 7 days)     05/22/25  1029   INR 2.5*       ASSESSMENT     Source(s): Chart Review  Previous INR was Therapeutic last 2(+) visits  Medication, diet, health changes since last INR chart reviewed; none identified         PLAN     Recommended plan for no diet, medication or health factor changes affecting INR     Dosing Instructions: Continue your current warfarin dose with next INR in 2 weeks       Summary  As of 5/22/2025      Full warfarin instructions:  2.5 mg every Fri; 5 mg all other days   Next INR check:  6/5/2025               Detailed voice message left for Rosio with dosing instructions and follow up date.   Sent [employer] message with dosing and follow up instructions    Contact [phone] to schedule and with any changes, questions or concerns.     Education provided: Contact [phone] with any changes, questions or concerns.     Plan made per [facility] anticoagulation protocol    Sandy Dunlap RN  5/22/2025  Anticoagulation Clinic  [employer] for routing messages: p ANTICOAG [facility]  [facility] patient phone line: [phone]        _______________________________________________________________________     Anticoagulation Episode Summary       Current INR goal:  2.0-3.0   TTR:  54.7% (1.3 mo)   Target end date:  Indefinite   Send INR reminders to:  [facility]    Indications    Atrial fibrillation  unspecified type (H) [I48.91]             Comments:  --             Anticoagulation Care Providers       Provider Role Specialty Phone number    Sandy Medina MD Referring Cardiovascular Disease [phone]

## 2025-05-30 ENCOUNTER — ANTICOAGULATION THERAPY VISIT (OUTPATIENT)
Dept: ANTICOAGULATION | Facility: CLINIC | Age: 70
End: 2025-05-30

## 2025-05-30 DIAGNOSIS — I48.91 ATRIAL FIBRILLATION, UNSPECIFIED TYPE (H): Primary | ICD-10-CM

## 2025-05-30 NOTE — PROGRESS NOTES
ANTICOAGULATION MANAGEMENT     Malcom Coates 69 year old male is on warfarin with subtherapeutic INR result. (Goal INR 2.0-3.0)    Recent labs: (last 7 days)     05/30/25  1031   INR 1.7*       ASSESSMENT     Source(s): Chart Review and Patient/Caregiver Call     Warfarin doses taken: Warfarin taken as instructed  Diet: Increased greens/vitamin K in diet; plans to resume previous intake  Medication/supplement changes: None noted  New illness, injury, or hospitalization: No  Signs or symptoms of bleeding or clotting: No  Previous result: Therapeutic last 2(+) visits  Additional findings: None       PLAN     Recommended plan for temporary change(s) affecting INR     Dosing Instructions: booster dose then continue your current warfarin dose with next INR in 2 weeks       Summary  As of 5/30/2025      Full warfarin instructions:  5/30: 5 mg; Otherwise 2.5 mg every Fri; 5 mg all other days   Next INR check:  6/13/2025               Telephone call with Romeo who verbalizes understanding and agrees to plan  Detailed voice message left for Rosio with dosing instructions and follow up date.     Lab visit scheduled    Education provided: Please call back if any changes to your diet, medications or how you've been taking warfarin  Symptom monitoring: monitoring for clotting signs and symptoms and monitoring for stroke signs and symptoms    Plan made per [facility] anticoagulation protocol    Ayah Jacques, RN  6/2/2025  Anticoagulation Clinic  [facility] for routing messages: p ANTICOAG [facility]  [facility] patient phone line: [phone]        _______________________________________________________________________     Anticoagulation Episode Summary       Current INR goal:  2.0-3.0   TTR:  56.0% (1.6 mo)   Target end date:  Indefinite   Send INR reminders to:  [facility]    Indications    Atrial fibrillation  unspecified type (H) [I48.91]             Comments:  --             Anticoagulation Care Providers        Provider Role Specialty Phone number    Sandy Medina MD Referring Cardiovascular Disease [phone]

## 2025-06-06 ENCOUNTER — RESULTS FOLLOW-UP (OUTPATIENT)
Dept: ANTICOAGULATION | Facility: CLINIC | Age: 70
End: 2025-06-06

## 2025-06-16 ENCOUNTER — ANTICOAGULATION THERAPY VISIT (OUTPATIENT)
Dept: ANTICOAGULATION | Facility: CLINIC | Age: 70
End: 2025-06-16

## 2025-06-16 ENCOUNTER — LAB (OUTPATIENT)
Dept: LAB | Facility: CLINIC | Age: 70
End: 2025-06-16
Payer: COMMERCIAL

## 2025-06-16 DIAGNOSIS — I48.91 ATRIAL FIBRILLATION, UNSPECIFIED TYPE (H): Primary | ICD-10-CM

## 2025-06-16 DIAGNOSIS — I48.91 ATRIAL FIBRILLATION, UNSPECIFIED TYPE (H): ICD-10-CM

## 2025-06-16 LAB — INR BLD: 6.3 (ref 0.9–1.1)

## 2025-06-16 PROCEDURE — 85610 PROTHROMBIN TIME: CPT

## 2025-06-16 PROCEDURE — 36416 COLLJ CAPILLARY BLOOD SPEC: CPT

## 2025-06-16 NOTE — PROGRESS NOTES
ANTICOAGULATION MANAGEMENT     Malcom Coates 69 year old male is on warfarin with supratherapeutic INR result. (Goal INR 2.0-3.0)    Recent labs: (last 7 days)     06/16/25  1650   INR 6.3*       ASSESSMENT     Warfarin Lab Questionnaire    Warfarin Doses Last 7 Days      6/16/2025     9:37 AM   Dose in Tablet or Mg   TAB or MG? tablet (tab)     Pt Rptd Dose SUNDAY MONDAY TUESDAY WED THURS FRIDAY SATURDAY 6/16/2025   9:37 AM 1 1 1 1 1 0.5 1         6/16/2025   Warfarin Lab Questionnaire   Missed doses within past 14 days? No   Changes in diet or alcohol within past 14 days? No   Medication changes since last result? No   Injuries or illness since last result? No   New shortness of breath, severe headaches or sudden changes in vision since last result? No   Abnormal bleeding since last result? No   Upcoming surgery, procedure? No     Previous result: Supratherapeutic  Additional findings: None       PLAN     Unable to reach Bong today.    Left message to hold warfarin tonight. Request call back for assessment.    Follow up required to confirm warfarin dose taken and assess for changes, assess for changes , discuss out of range result , and discuss dosing instructions and confirm understanding of instructions    Corey Moon, RN  6/16/2025  Anticoagulation Clinic  [facility] for routing messages: p ANTICOAG [facility] patient phone line: [phone]

## 2025-06-17 ENCOUNTER — RESULTS FOLLOW-UP (OUTPATIENT)
Dept: ANTICOAGULATION | Facility: CLINIC | Age: 70
End: 2025-06-17

## 2025-06-17 NOTE — PROGRESS NOTES
"ANTICOAGULATION MANAGEMENT     Malcom Coates 69 year old male is on warfarin with supratherapeutic INR result. (Goal INR 2.0-3.0)    Recent labs: (last 7 days)     06/16/25  1650   INR 6.3*       ASSESSMENT     Warfarin Lab Questionnaire    Warfarin Doses Last 7 Days      6/16/2025     9:37 AM   Dose in Tablet or Mg   TAB or MG? tablet (tab)     Pt Rptd Dose SUNDAY MONDAY TUESDAY WED THURS FRIDAY SATURDAY 6/16/2025   9:37 AM 1 1 1 1 1 0.5 1         6/16/2025   Warfarin Lab Questionnaire   Missed doses within past 14 days? No   Changes in diet or alcohol within past 14 days? No, vitamin K intake is variable and inconsistent. Rosio believes that Bong will eat more vitamin K with the upcoming warmer weather.   Medication changes since last result? No   Injuries or illness since last result? No, Yes, scratch on arm that is a \"little red\", no oozing; however, Rosio is concerned about possible infection, writer advised provider appointment to assess for cellulitis, Rosio verbalized understanding.   New shortness of breath, severe headaches or sudden changes in vision since last result? No   Abnormal bleeding since last result? No   Upcoming surgery, procedure? No     Previous result: Supratherapeutic  Additional findings: Rosio believes that Bong took his warfarin on 6/16/25  No change in warfarin maintenance dose at this time as Rosio is hopeful that Bong will increase his vitamin K intake.     PLAN     Recommended plan for temporary change(s) affecting INR     Dosing Instructions: HOLD 2 doses with next INR in 3 days       Summary  As of 6/16/2025      Full warfarin instructions:  6/17: Hold; 6/18: Hold; Otherwise 2.5 mg every Fri; 5 mg all other days   Next INR check:  6/19/2025               Telephone call with Rosio who verbalizes understanding and agrees to plan    Lab visit scheduled    Education provided: Dietary considerations: importance of consistent vitamin K intake, impact of " vitamin K foods on INR, and vitamin K content of foods  Importance of reporting any medication changes, ie: antibiotic for arm.    Plan made per [facility] anticoagulation protocol    Mary Mcgee RN  6/17/2025  Anticoagulation Clinic  [facility] for routing messages: p ANTICOAG [facility]  [facility] patient phone line: [phone]        _______________________________________________________________________     Anticoagulation Episode Summary       Current INR goal:  2.0-3.0   TTR:  46.9% (2.2 mo)   Target end date:  Indefinite   Send INR reminders to:  [facility]AG [facility]    Indications    Atrial fibrillation  unspecified type (H) [I48.91]             Comments:  --             Anticoagulation Care Providers       Provider Role Specialty Phone number    Sandy Medina MD Referring Cardiovascular Disease [phone]

## 2025-06-19 ENCOUNTER — LAB (OUTPATIENT)
Dept: LAB | Facility: CLINIC | Age: 70
End: 2025-06-19
Payer: COMMERCIAL

## 2025-06-19 ENCOUNTER — ANTICOAGULATION THERAPY VISIT (OUTPATIENT)
Dept: ANTICOAGULATION | Facility: CLINIC | Age: 70
End: 2025-06-19

## 2025-06-19 ENCOUNTER — RESULTS FOLLOW-UP (OUTPATIENT)
Dept: ANTICOAGULATION | Facility: CLINIC | Age: 70
End: 2025-06-19

## 2025-06-19 DIAGNOSIS — I48.91 ATRIAL FIBRILLATION, UNSPECIFIED TYPE (H): Primary | ICD-10-CM

## 2025-06-19 DIAGNOSIS — I48.91 ATRIAL FIBRILLATION, UNSPECIFIED TYPE (H): ICD-10-CM

## 2025-06-19 LAB — INR BLD: 2.4 (ref 0.9–1.1)

## 2025-06-19 NOTE — PROGRESS NOTES
ANTICOAGULATION MANAGEMENT     Malcom Coates 69 year old male is on warfarin with therapeutic INR result. (Goal INR 2.0-3.0)    Recent labs: (last 7 days)     06/19/25  1454   INR 2.4*       ASSESSMENT     Warfarin Lab Questionnaire    Warfarin Doses Last 7 Days      6/19/2025     2:37 PM   Dose in Tablet or Mg   TAB or MG? tablet (tab)     Pt Rptd Dose SUNDAY MONDAY TUESDAY WED FRIDAY SATURDAY 6/19/2025   2:37 PM 1 1 0 0 1 1         6/19/2025   Warfarin Lab Questionnaire   Missed doses within past 14 days? No Held 2 days for elevated INR   Changes in diet or alcohol within past 14 days? No   Medication changes since last result? No   Injuries or illness since last result? No   New shortness of breath, severe headaches or sudden changes in vision since last result? No   Abnormal bleeding since last result? No   Upcoming surgery, procedure? No     Previous result: Supratherapeutic  Additional findings: None       PLAN     Recommended plan for ongoing change(s) affecting INR     Dosing Instructions: decrease your warfarin dose (15.4% change) with next INR in 1 week. Decrease per protocol for INR of 6.3. Diet inconsistent.      Summary  As of 6/19/2025      Full warfarin instructions:  2.5 mg every Mon, Wed, Fri; 5 mg all other days   Next INR check:  6/26/2025               Sent [employer] message with dosing and follow up instructions    Contact [phone] to schedule and with any changes, questions or concerns.     Education provided: Goal range and lab monitoring: goal range and significance of current result and Importance of therapeutic range    Plan made per [facility] anticoagulation protocol    Katelyn BLUM RN  6/19/2025  Anticoagulation Clinic  [facility] for routing messages: p ANTICOAG [facility]  [facility] patient phone line: [phone]        _______________________________________________________________________     Anticoagulation Episode Summary       Current INR goal:  2.0-3.0   TTR:  45.5% (2.3 mo)    Target end date:  Indefinite   Send INR reminders to:  [facility]AG [facility]    Indications    Atrial fibrillation  unspecified type (H) [I48.91]             Comments:  --             Anticoagulation Care Providers       Provider Role Specialty Phone number    Sandy Medina MD Referring Cardiovascular Disease [phone]

## 2025-06-26 ENCOUNTER — RESULTS FOLLOW-UP (OUTPATIENT)
Dept: ANTICOAGULATION | Facility: CLINIC | Age: 70
End: 2025-06-26

## 2025-06-26 ENCOUNTER — LAB (OUTPATIENT)
Dept: LAB | Facility: CLINIC | Age: 70
End: 2025-06-26
Payer: COMMERCIAL

## 2025-06-26 ENCOUNTER — ANTICOAGULATION THERAPY VISIT (OUTPATIENT)
Dept: ANTICOAGULATION | Facility: CLINIC | Age: 70
End: 2025-06-26

## 2025-06-26 DIAGNOSIS — I48.91 ATRIAL FIBRILLATION, UNSPECIFIED TYPE (H): ICD-10-CM

## 2025-06-26 DIAGNOSIS — I48.91 ATRIAL FIBRILLATION, UNSPECIFIED TYPE (H): Primary | ICD-10-CM

## 2025-06-26 LAB — INR BLD: 1.9 (ref 0.9–1.1)

## 2025-06-26 NOTE — PROGRESS NOTES
ANTICOAGULATION MANAGEMENT     Malcom Coates 69 year old male is on warfarin with subtherapeutic INR result. (Goal INR 2.0-3.0)    Recent labs: (last 7 days)     06/26/25  1305   INR 1.9*       ASSESSMENT     Warfarin Lab Questionnaire    Warfarin Doses Last 7 Days      6/26/2025    11:22 AM   Dose in Tablet or Mg   TAB or MG? milligram (mg)     Pt Rptd Dose SUNDAY MONDAY TUESDAY WED THURS FRIDAY SATURDAY 6/26/2025  11:22 AM 0.5 0.5 0.5 0.5 0.5 0.25 0.5         6/26/2025   Warfarin Lab Questionnaire   Missed doses within past 14 days? No   Changes in diet or alcohol within past 14 days? No   Medication changes since last result? No   Injuries or illness since last result? No   New shortness of breath, severe headaches or sudden changes in vision since last result? No   Abnormal bleeding since last result? No   Upcoming surgery, procedure? No     Previous result: Therapeutic last visit; previously outside of goal range  Additional findings: None       PLAN     Recommended plan for temporary change(s) affecting INR - dose was decreased with last INR due to high INR on 6/16/25.     Dosing Instructions: Increase your warfarin dose (9.1% change) with next INR in 2 weeks       Summary  As of 6/26/2025      Full warfarin instructions:  2.5 mg every Mon, Fri; 5 mg all other days   Next INR check:  7/10/2025               Telephone call with Daughter Rosio who verbalizes understanding and agrees to plan and who agrees to plan and repeated back plan correctly  Sent [employer] message with dosing and follow up instructions    Lab visit scheduled    Education provided: Goal range and lab monitoring: goal range and significance of current result, Importance of therapeutic range, and Importance of following up at instructed interval  Symptom monitoring: monitoring for bleeding signs and symptoms, monitoring for clotting signs and symptoms, and when to seek medical attention/emergency care  Contact [phone] with any  changes, questions or concerns.     Plan made per [facility] anticoagulation protocol    Corey Moon, RN  6/26/2025  Anticoagulation Clinic  [facility] for routing messages: p ANTICOAG [facility]  [facility] patient phone line: [phone]        _______________________________________________________________________     Anticoagulation Episode Summary       Current INR goal:  2.0-3.0   TTR:  48.7% (2.5 mo)   Target end date:  Indefinite   Send INR reminders to:  [facility]    Indications    Atrial fibrillation  unspecified type (H) [I48.91]             Comments:  --             Anticoagulation Care Providers       Provider Role Specialty Phone number    Sandy Medina MD Referring Cardiovascular Disease [phone]

## 2025-07-03 ENCOUNTER — LAB (OUTPATIENT)
Dept: LAB | Facility: CLINIC | Age: 70
End: 2025-07-03
Payer: COMMERCIAL

## 2025-07-03 ENCOUNTER — RESULTS FOLLOW-UP (OUTPATIENT)
Dept: ANTICOAGULATION | Facility: CLINIC | Age: 70
End: 2025-07-03

## 2025-07-03 ENCOUNTER — ANTICOAGULATION THERAPY VISIT (OUTPATIENT)
Dept: ANTICOAGULATION | Facility: CLINIC | Age: 70
End: 2025-07-03

## 2025-07-03 DIAGNOSIS — I48.91 ATRIAL FIBRILLATION, UNSPECIFIED TYPE (H): ICD-10-CM

## 2025-07-03 DIAGNOSIS — I48.91 ATRIAL FIBRILLATION, UNSPECIFIED TYPE (H): Primary | ICD-10-CM

## 2025-07-03 LAB — INR BLD: 1.5 (ref 0.9–1.1)

## 2025-07-03 NOTE — PROGRESS NOTES
ANTICOAGULATION MANAGEMENT     Malcom Coates 69 year old male is on warfarin with subtherapeutic INR result. (Goal INR 2.0-3.0)    Recent labs: (last 7 days)     07/03/25  1353   INR 1.5*       ASSESSMENT     Source(s): Chart Review and Patient/Caregiver Call     Warfarin doses taken: Warfarin taken as instructed  Diet: No new diet changes identified  Medication/supplement changes: None noted  New illness, injury, or hospitalization: No  Signs or symptoms of bleeding or clotting: No  Previous result: Subtherapeutic  Additional findings: None       PLAN     Recommended plan for no diet, medication or health factor changes affecting INR     Dosing Instructions: Increase your warfarin dose (8.3% change) with next INR in 2 weeks       Summary  As of 7/3/2025      Full warfarin instructions:  2.5 mg every Mon; 5 mg all other days   Next INR check:  7/17/2025               Telephone call with Rosio who verbalizes understanding and agrees to plan and who agrees to plan and repeated back plan correctly    Lab visit scheduled    Education provided: Goal range and lab monitoring: goal range and significance of current result, Importance of therapeutic range, and Importance of following up at instructed interval  Symptom monitoring: monitoring for bleeding signs and symptoms, monitoring for clotting signs and symptoms, and when to seek medical attention/emergency care  Contact [phone] with any changes, questions or concerns.     Plan made per [facility] anticoagulation protocol    Corey Moon RN  7/3/2025  Anticoagulation Clinic  [facility] for routing messages: p ANTICOAG [facility]  [facility] patient phone line: [phone]        _______________________________________________________________________     Anticoagulation Episode Summary       Current INR goal:  2.0-3.0   TTR:  44.5% (2.7 mo)   Target end date:  Indefinite   Send INR reminders to:  [facility]AG [facility]    Indications    Atrial  fibrillation  unspecified type (H) [I48.91]             Comments:  --             Anticoagulation Care Providers       Provider Role Specialty Phone number    Sandy Medina MD Referring Cardiovascular Disease [phone]

## 2025-07-17 ENCOUNTER — LAB (OUTPATIENT)
Dept: LAB | Facility: CLINIC | Age: 70
End: 2025-07-17
Payer: COMMERCIAL

## 2025-07-17 ENCOUNTER — RESULTS FOLLOW-UP (OUTPATIENT)
Dept: ANTICOAGULATION | Facility: CLINIC | Age: 70
End: 2025-07-17

## 2025-07-17 ENCOUNTER — ANTICOAGULATION THERAPY VISIT (OUTPATIENT)
Dept: ANTICOAGULATION | Facility: CLINIC | Age: 70
End: 2025-07-17

## 2025-07-17 DIAGNOSIS — I48.91 ATRIAL FIBRILLATION, UNSPECIFIED TYPE (H): Primary | ICD-10-CM

## 2025-07-17 DIAGNOSIS — I48.91 ATRIAL FIBRILLATION, UNSPECIFIED TYPE (H): ICD-10-CM

## 2025-07-17 LAB — INR BLD: 3.1 (ref 0.9–1.1)

## 2025-07-17 NOTE — PROGRESS NOTES
ANTICOAGULATION MANAGEMENT     Malcom Coates 69 year old male is on warfarin with supratherapeutic INR result. (Goal INR 2.0-3.0)    Recent labs: (last 7 days)     07/17/25  1353   INR 3.1*       ASSESSMENT     Warfarin Lab Questionnaire    Warfarin Doses Last 7 Days      7/17/2025    10:56 AM   Dose in Tablet or Mg   TAB or MG? tablet (tab)     Pt Rptd Dose SUNDAY MONDAY TUESDAY WED THURS FRIDAY SATURDAY 7/17/2025  10:56 AM 1 1 1 1 1 1 1         7/17/2025   Warfarin Lab Questionnaire   Missed doses within past 14 days? No. More warfarin taken then planned.   Changes in diet or alcohol within past 14 days? No   Medication changes since last result? No   Injuries or illness since last result? No   New shortness of breath, severe headaches or sudden changes in vision since last result? No   Abnormal bleeding since last result? No   Upcoming surgery, procedure? No     Previous result: Subtherapeutic  Additional findings: None       PLAN     Recommended plan for temporary change(s) affecting INR     Dosing Instructions: Continue your current warfarin dose with next INR in 2 weeks       Summary  As of 7/17/2025      Full warfarin instructions:  2.5 mg every Mon; 5 mg all other days   Next INR check:  7/31/2025               Sent [employer] message with dosing and follow up instructions    Contact [phone] to schedule and with any changes, questions or concerns.     Education provided: Please call back if any changes to your diet, medications or how you've been taking warfarin    Plan made per [facility] anticoagulation protocol    Katelyn BLUM RN  7/17/2025  Anticoagulation Clinic  [facility] for routing messages: p ANTICOAG NEW [facility]  [facility] patient phone line: [phone]        _______________________________________________________________________     Anticoagulation Episode Summary       Current INR goal:  2.0-3.0   TTR:  47.1% (3.2 mo)   Target end date:  Indefinite   Send INR reminders to:  ANTICOAG [facility]     Indications    Atrial fibrillation  unspecified type (H) [I48.91]             Comments:  --             Anticoagulation Care Providers       Provider Role Specialty Phone number    Sandy Medina MD Referring Cardiovascular Disease [phone]

## 2025-07-24 ENCOUNTER — RESULTS FOLLOW-UP (OUTPATIENT)
Dept: ANTICOAGULATION | Facility: CLINIC | Age: 70
End: 2025-07-24

## 2025-07-24 ENCOUNTER — ANTICOAGULATION THERAPY VISIT (OUTPATIENT)
Dept: ANTICOAGULATION | Facility: CLINIC | Age: 70
End: 2025-07-24

## 2025-07-24 ENCOUNTER — LAB (OUTPATIENT)
Dept: LAB | Facility: CLINIC | Age: 70
End: 2025-07-24
Payer: COMMERCIAL

## 2025-07-24 DIAGNOSIS — I48.91 ATRIAL FIBRILLATION, UNSPECIFIED TYPE (H): Primary | ICD-10-CM

## 2025-07-24 DIAGNOSIS — I10 PRIMARY HYPERTENSION: ICD-10-CM

## 2025-07-24 DIAGNOSIS — I48.91 ATRIAL FIBRILLATION, UNSPECIFIED TYPE (H): ICD-10-CM

## 2025-07-24 DIAGNOSIS — E11.42 DIABETIC POLYNEUROPATHY ASSOCIATED WITH TYPE 2 DIABETES MELLITUS (H): ICD-10-CM

## 2025-07-24 LAB
ANION GAP SERPL CALCULATED.3IONS-SCNC: 10 MMOL/L (ref 7–15)
BUN SERPL-MCNC: 23.3 MG/DL (ref 8–23)
CALCIUM SERPL-MCNC: 9.6 MG/DL (ref 8.8–10.4)
CHLORIDE SERPL-SCNC: 103 MMOL/L (ref 98–107)
CHOLEST SERPL-MCNC: 120 MG/DL
CREAT SERPL-MCNC: 1.04 MG/DL (ref 0.67–1.17)
CREAT UR-MCNC: 138 MG/DL
EGFRCR SERPLBLD CKD-EPI 2021: 77 ML/MIN/1.73M2
EST. AVERAGE GLUCOSE BLD GHB EST-MCNC: 146 MG/DL
FASTING STATUS PATIENT QL REPORTED: NO
FASTING STATUS PATIENT QL REPORTED: NO
GLUCOSE SERPL-MCNC: 156 MG/DL (ref 70–99)
HBA1C MFR BLD: 6.7 % (ref 0–5.6)
HCO3 SERPL-SCNC: 25 MMOL/L (ref 22–29)
HDLC SERPL-MCNC: 37 MG/DL
INR BLD: 2.1 (ref 0.9–1.1)
LDLC SERPL CALC-MCNC: 69 MG/DL
MICROALBUMIN UR-MCNC: 17.5 MG/L
MICROALBUMIN/CREAT UR: 12.68 MG/G CR (ref 0–17)
NONHDLC SERPL-MCNC: 83 MG/DL
POTASSIUM SERPL-SCNC: 4.6 MMOL/L (ref 3.4–5.3)
SODIUM SERPL-SCNC: 138 MMOL/L (ref 135–145)
TRIGL SERPL-MCNC: 70 MG/DL

## 2025-07-24 NOTE — PROGRESS NOTES
ANTICOAGULATION MANAGEMENT     Malcom Coates 69 year old male is on warfarin with therapeutic INR result. (Goal INR 2.0-3.0)    Recent labs: (last 7 days)     07/24/25  1139   INR 2.1*       ASSESSMENT     Warfarin Lab Questionnaire    Warfarin Doses Last 7 Days      7/23/2025     4:34 PM   Dose in Tablet or Mg   TAB or MG? milligram (mg)     Pt Rptd Dose SUNDAY MONDAY TUESDAY WED THURS FRIDAY SATURDAY 7/23/2025   4:34 PM 0.5 0.5 0.5 0.5 0.5 0.25 0.5         7/23/2025   Warfarin Lab Questionnaire   Missed doses within past 14 days? No   Changes in diet or alcohol within past 14 days? No   Medication changes since last result? No   Injuries or illness since last result? No   New shortness of breath, severe headaches or sudden changes in vision since last result? No   Abnormal bleeding since last result? No   Upcoming surgery, procedure? No     Previous result: Supratherapeutic  Additional findings: None verified with patient on dosing, he did take 2.5 MG Fri and 5 MG all other days. Had difficulty entering the dosing on the survey.       PLAN     Recommended plan for no diet, medication or health factor changes affecting INR     Dosing Instructions: Continue your current warfarin dose with next INR in 2 weeks       Summary  As of 7/24/2025      Full warfarin instructions:  2.5 mg every Fri; 5 mg all other days   Next INR check:  8/7/2025               Telephone call with Bong who verbalizes understanding and agrees to plan    Lab visit scheduled    Education provided: Contact [phone] with any changes, questions or concerns.     Plan made per [facility] anticoagulation protocol    Sandy Dunlap RN  7/24/2025  Anticoagulation Clinic  [employer] for routing messages: p ANTICOAG NEW [facility] patient phone line: [phone]        _______________________________________________________________________     Anticoagulation Episode Summary       Current INR goal:  2.0-3.0   TTR:  50.0% (3.4 mo)   Target end  date:  Indefinite   Send INR reminders to:  [facility]AG [facility]    Indications    Atrial fibrillation  unspecified type (H) [I48.91]             Comments:  --             Anticoagulation Care Providers       Provider Role Specialty Phone number    Sandy Medina MD Referring Cardiovascular Disease [phone]

## 2025-08-07 ENCOUNTER — LAB (OUTPATIENT)
Dept: LAB | Facility: CLINIC | Age: 70
End: 2025-08-07
Payer: COMMERCIAL

## 2025-08-07 ENCOUNTER — ANTICOAGULATION THERAPY VISIT (OUTPATIENT)
Dept: ANTICOAGULATION | Facility: CLINIC | Age: 70
End: 2025-08-07

## 2025-08-07 DIAGNOSIS — I48.91 ATRIAL FIBRILLATION, UNSPECIFIED TYPE (H): ICD-10-CM

## 2025-08-07 DIAGNOSIS — I48.91 ATRIAL FIBRILLATION, UNSPECIFIED TYPE (H): Primary | ICD-10-CM

## 2025-08-07 LAB — INR BLD: 1.8 (ref 0.9–1.1)

## 2025-08-21 ENCOUNTER — RESULTS FOLLOW-UP (OUTPATIENT)
Dept: ANTICOAGULATION | Facility: CLINIC | Age: 70
End: 2025-08-21

## 2025-08-21 ENCOUNTER — LAB (OUTPATIENT)
Dept: LAB | Facility: CLINIC | Age: 70
End: 2025-08-21
Payer: COMMERCIAL

## 2025-08-21 ENCOUNTER — ANTICOAGULATION THERAPY VISIT (OUTPATIENT)
Dept: ANTICOAGULATION | Facility: CLINIC | Age: 70
End: 2025-08-21

## 2025-08-21 DIAGNOSIS — I48.91 ATRIAL FIBRILLATION, UNSPECIFIED TYPE (H): ICD-10-CM

## 2025-08-21 DIAGNOSIS — I48.91 ATRIAL FIBRILLATION, UNSPECIFIED TYPE (H): Primary | ICD-10-CM

## 2025-08-21 LAB — INR BLD: 2.7 (ref 0.9–1.1)

## 2025-08-23 DIAGNOSIS — I10 ESSENTIAL HYPERTENSION: ICD-10-CM

## 2025-08-24 DIAGNOSIS — E78.5 DYSLIPIDEMIA: ICD-10-CM

## 2025-08-24 RX ORDER — ATORVASTATIN CALCIUM 40 MG/1
40 TABLET, FILM COATED ORAL AT BEDTIME
Qty: 100 TABLET | Refills: 0 | Status: SHIPPED | OUTPATIENT
Start: 2025-08-24

## 2025-08-25 ENCOUNTER — DOCUMENTATION ONLY (OUTPATIENT)
Dept: FAMILY MEDICINE | Facility: CLINIC | Age: 70
End: 2025-08-25
Payer: COMMERCIAL

## 2025-08-25 RX ORDER — LISINOPRIL 20 MG/1
20 TABLET ORAL DAILY
Qty: 100 TABLET | Refills: 2 | Status: SHIPPED | OUTPATIENT
Start: 2025-08-25

## 2025-08-26 DIAGNOSIS — E11.00 TYPE 2 DIABETES MELLITUS WITH HYPEROSMOLARITY WITHOUT COMA, WITHOUT LONG-TERM CURRENT USE OF INSULIN (H): ICD-10-CM
